# Patient Record
Sex: FEMALE | Race: OTHER | Employment: OTHER | URBAN - METROPOLITAN AREA
[De-identification: names, ages, dates, MRNs, and addresses within clinical notes are randomized per-mention and may not be internally consistent; named-entity substitution may affect disease eponyms.]

---

## 2019-07-08 ENCOUNTER — OFFICE VISIT (OUTPATIENT)
Dept: FAMILY MEDICINE CLINIC | Facility: CLINIC | Age: 84
End: 2019-07-08
Payer: MEDICAID

## 2019-07-08 VITALS
DIASTOLIC BLOOD PRESSURE: 60 MMHG | SYSTOLIC BLOOD PRESSURE: 118 MMHG | TEMPERATURE: 99 F | HEART RATE: 68 BPM | WEIGHT: 152.38 LBS | BODY MASS INDEX: 29.53 KG/M2 | OXYGEN SATURATION: 97 % | HEIGHT: 60.43 IN

## 2019-07-08 DIAGNOSIS — I47.1 SUPRAVENTRICULAR TACHYCARDIA (HCC): ICD-10-CM

## 2019-07-08 DIAGNOSIS — S91.301A WOUND OF RIGHT FOOT: Primary | ICD-10-CM

## 2019-07-08 DIAGNOSIS — Z13.820 ENCOUNTER FOR SCREENING FOR OSTEOPOROSIS: ICD-10-CM

## 2019-07-08 PROCEDURE — 99203 OFFICE O/P NEW LOW 30 MIN: CPT | Performed by: FAMILY MEDICINE

## 2019-07-08 NOTE — PATIENT INSTRUCTIONS
-- llama para hacer tripp bertha de londono infection  -- continua antibiotico un ves al lauro    -- regresa en 2 wks para londono physico  -- regresa en ayunas para lazara en lo mismo lauro

## 2019-07-08 NOTE — PROGRESS NOTES
Cornelio Dutton is a 82year old female here for Patient presents with: Foot Injury: Right foot injury in May. Hit her self with a wood stick and a couple of days later a juice container fell hitting her on the same spot as the wood had hit her.  Several d negative    PHYSICAL EXAM:   /60 (BP Location: Left arm, Patient Position: Sitting, Cuff Size: adult)   Pulse 68   Temp 98.5 °F (36.9 °C) (Oral)   Ht 60.43\"   Wt 152 lb 6 oz   SpO2 97%   BMI 29.33 kg/m²     Gen: NAD, alert and oriented x 3  HEENT: N

## 2019-07-26 ENCOUNTER — OFFICE VISIT (OUTPATIENT)
Dept: WOUND CARE | Facility: HOSPITAL | Age: 84
End: 2019-07-26
Attending: INTERNAL MEDICINE
Payer: MEDICAID

## 2019-07-26 DIAGNOSIS — R60.0 LOCALIZED EDEMA: ICD-10-CM

## 2019-07-26 DIAGNOSIS — S91.001A UNSPECIFIED OPEN WOUND, RIGHT ANKLE, INITIAL ENCOUNTER: Primary | ICD-10-CM

## 2019-07-26 PROCEDURE — 11042 DBRDMT SUBQ TIS 1ST 20SQCM/<: CPT

## 2019-08-02 ENCOUNTER — OFFICE VISIT (OUTPATIENT)
Dept: WOUND CARE | Facility: HOSPITAL | Age: 84
End: 2019-08-02
Attending: INTERNAL MEDICINE
Payer: MEDICAID

## 2019-08-02 DIAGNOSIS — S91.001A UNSPECIFIED OPEN WOUND, RIGHT ANKLE, INITIAL ENCOUNTER: Primary | ICD-10-CM

## 2019-08-02 PROCEDURE — 97597 DBRDMT OPN WND 1ST 20 CM/<: CPT

## 2019-08-02 NOTE — PROGRESS NOTES
Chief Complaint  This information was obtained from the patient  Patient is here for a wound care follow up. She continues to have some intermittent pain. She is asking about an antibiotic to help speed up the healing process.     Allergies  Cardizem Citizen of [place] Local Pulse is Normal.    Lower Extremity Assessment  Edema Assessment:  Left Extremity: Edema is not present  Calf Measurement 31 cm from heel  Ankle Measurement 10 cm from heel  Right Extremity: Edema is not present  Calf Measurement 31 cm from heel with cleanser  Honey Gel  Bordered gauze  Change Dressing Daily and PRN    Additional Orders:    Topicals:  4% Topical Lidocaine    Follow-Up Appointments  Return Appointment in 1 week. Care summary  Discussed the Plan of Care at bedside with patient.  The pa

## 2019-08-16 ENCOUNTER — OFFICE VISIT (OUTPATIENT)
Dept: WOUND CARE | Facility: HOSPITAL | Age: 84
End: 2019-08-16
Attending: INTERNAL MEDICINE
Payer: MEDICAID

## 2019-08-16 DIAGNOSIS — R60.0 LOCALIZED EDEMA: ICD-10-CM

## 2019-08-16 DIAGNOSIS — S91.001A UNSPECIFIED OPEN WOUND, RIGHT ANKLE, INITIAL ENCOUNTER: Primary | ICD-10-CM

## 2019-08-16 PROCEDURE — 97597 DBRDMT OPN WND 1ST 20 CM/<: CPT

## 2019-08-16 NOTE — PROGRESS NOTES
Chief Complaint  This information was obtained from the patient  Patient is here for a wound care follow up. She continues to have some intermittent pain.      Allergies  Cardizem (Reaction: rash)    HPI  This information was obtained from the patient Assessment  Edema Assessment:  Left Extremity: Edema is not present  Calf Measurement 31 cm from heel  Ankle Measurement 10 cm from heel  Right Extremity: Edema is present  Compression Device In Use: Yes  Device Used Correctly: Yes  Compression Device Used Orders:  Wound #1 Right, Anterior Ankle     Wound Cleansing & Dressings  Cleanse with saline or wound cleanser  Honey Gel  Bordered gauze  Change Dressing Daily and PRN    Off-Loading  Other: - ANKLE BRACE TO RESTRICT MOVEMENT AT THE ANKLE    Additional Or

## 2019-08-23 ENCOUNTER — LAB ENCOUNTER (OUTPATIENT)
Dept: LAB | Age: 84
End: 2019-08-23
Attending: FAMILY MEDICINE
Payer: MEDICAID

## 2019-08-23 ENCOUNTER — OFFICE VISIT (OUTPATIENT)
Dept: FAMILY MEDICINE CLINIC | Facility: CLINIC | Age: 84
End: 2019-08-23
Payer: MEDICAID

## 2019-08-23 VITALS
HEIGHT: 60.43 IN | BODY MASS INDEX: 29.69 KG/M2 | HEART RATE: 66 BPM | OXYGEN SATURATION: 98 % | DIASTOLIC BLOOD PRESSURE: 60 MMHG | WEIGHT: 153.25 LBS | TEMPERATURE: 98 F | SYSTOLIC BLOOD PRESSURE: 122 MMHG

## 2019-08-23 DIAGNOSIS — S91.301A WOUND OF RIGHT FOOT: ICD-10-CM

## 2019-08-23 DIAGNOSIS — Z00.00 ROUTINE GENERAL MEDICAL EXAMINATION AT A HEALTH CARE FACILITY: ICD-10-CM

## 2019-08-23 DIAGNOSIS — Z23 IMMUNIZATION DUE: ICD-10-CM

## 2019-08-23 DIAGNOSIS — Z00.00 ROUTINE GENERAL MEDICAL EXAMINATION AT A HEALTH CARE FACILITY: Primary | ICD-10-CM

## 2019-08-23 DIAGNOSIS — I47.1 SUPRAVENTRICULAR TACHYCARDIA (HCC): ICD-10-CM

## 2019-08-23 LAB
ALBUMIN SERPL-MCNC: 3.9 G/DL (ref 3.4–5)
ALBUMIN/GLOB SERPL: 1.3 {RATIO} (ref 1–2)
ALP LIVER SERPL-CCNC: 106 U/L (ref 55–142)
ALT SERPL-CCNC: 15 U/L (ref 13–56)
ANION GAP SERPL CALC-SCNC: 6 MMOL/L (ref 0–18)
AST SERPL-CCNC: 18 U/L (ref 15–37)
BASOPHILS # BLD AUTO: 0.03 X10(3) UL (ref 0–0.2)
BASOPHILS NFR BLD AUTO: 0.6 %
BILIRUB SERPL-MCNC: 0.5 MG/DL (ref 0.1–2)
BUN BLD-MCNC: 18 MG/DL (ref 7–18)
BUN/CREAT SERPL: 28.1 (ref 10–20)
CALCIUM BLD-MCNC: 8.9 MG/DL (ref 8.5–10.1)
CHLORIDE SERPL-SCNC: 107 MMOL/L (ref 98–112)
CHOLEST SMN-MCNC: 183 MG/DL (ref ?–200)
CO2 SERPL-SCNC: 27 MMOL/L (ref 21–32)
CREAT BLD-MCNC: 0.64 MG/DL (ref 0.55–1.02)
DEPRECATED RDW RBC AUTO: 43.8 FL (ref 35.1–46.3)
EOSINOPHIL # BLD AUTO: 0.21 X10(3) UL (ref 0–0.7)
EOSINOPHIL NFR BLD AUTO: 4.1 %
ERYTHROCYTE [DISTWIDTH] IN BLOOD BY AUTOMATED COUNT: 14.4 % (ref 11–15)
GLOBULIN PLAS-MCNC: 3 G/DL (ref 2.8–4.4)
GLUCOSE BLD-MCNC: 108 MG/DL (ref 70–99)
HCT VFR BLD AUTO: 43.1 % (ref 35–48)
HDLC SERPL-MCNC: 39 MG/DL (ref 40–59)
HGB BLD-MCNC: 13.7 G/DL (ref 12–16)
IMM GRANULOCYTES # BLD AUTO: 0.01 X10(3) UL (ref 0–1)
IMM GRANULOCYTES NFR BLD: 0.2 %
LDLC SERPL CALC-MCNC: 99 MG/DL (ref ?–100)
LYMPHOCYTES # BLD AUTO: 1.01 X10(3) UL (ref 1–4)
LYMPHOCYTES NFR BLD AUTO: 19.9 %
M PROTEIN MFR SERPL ELPH: 6.9 G/DL (ref 6.4–8.2)
MCH RBC QN AUTO: 26.6 PG (ref 26–34)
MCHC RBC AUTO-ENTMCNC: 31.8 G/DL (ref 31–37)
MCV RBC AUTO: 83.5 FL (ref 80–100)
MONOCYTES # BLD AUTO: 0.35 X10(3) UL (ref 0.1–1)
MONOCYTES NFR BLD AUTO: 6.9 %
NEUTROPHILS # BLD AUTO: 3.47 X10 (3) UL (ref 1.5–7.7)
NEUTROPHILS # BLD AUTO: 3.47 X10(3) UL (ref 1.5–7.7)
NEUTROPHILS NFR BLD AUTO: 68.3 %
NONHDLC SERPL-MCNC: 144 MG/DL (ref ?–130)
OSMOLALITY SERPL CALC.SUM OF ELEC: 292 MOSM/KG (ref 275–295)
PLATELET # BLD AUTO: 127 10(3)UL (ref 150–450)
POTASSIUM SERPL-SCNC: 4 MMOL/L (ref 3.5–5.1)
RBC # BLD AUTO: 5.16 X10(6)UL (ref 3.8–5.3)
SODIUM SERPL-SCNC: 140 MMOL/L (ref 136–145)
TRIGL SERPL-MCNC: 223 MG/DL (ref 30–149)
VLDLC SERPL CALC-MCNC: 45 MG/DL (ref 0–30)
WBC # BLD AUTO: 5.1 X10(3) UL (ref 4–11)

## 2019-08-23 PROCEDURE — 80061 LIPID PANEL: CPT

## 2019-08-23 PROCEDURE — 90472 IMMUNIZATION ADMIN EACH ADD: CPT | Performed by: FAMILY MEDICINE

## 2019-08-23 PROCEDURE — 99214 OFFICE O/P EST MOD 30 MIN: CPT | Performed by: FAMILY MEDICINE

## 2019-08-23 PROCEDURE — 90471 IMMUNIZATION ADMIN: CPT | Performed by: FAMILY MEDICINE

## 2019-08-23 PROCEDURE — 36415 COLL VENOUS BLD VENIPUNCTURE: CPT

## 2019-08-23 PROCEDURE — 85025 COMPLETE CBC W/AUTO DIFF WBC: CPT

## 2019-08-23 PROCEDURE — 90715 TDAP VACCINE 7 YRS/> IM: CPT | Performed by: FAMILY MEDICINE

## 2019-08-23 PROCEDURE — 99397 PER PM REEVAL EST PAT 65+ YR: CPT | Performed by: FAMILY MEDICINE

## 2019-08-23 PROCEDURE — 80053 COMPREHEN METABOLIC PANEL: CPT

## 2019-08-23 NOTE — PROGRESS NOTES
Maciel Yang is a 82year old female who is here for Patient presents with: Well Adult  Wound Recheck: Patient is following up with the wound clinic. She is gong every other week. Last appointment was last Friday.        HPI:     Right foot wound  -sl dizzyness, LOC, falls  VISION: denies any blurred or double vision  RESPIRATORY: denies shortness of breath, cough, or congestion  CARDIOVASCULAR: denies chest pain, pressure or palpitations  GI: denies abdominal pain, constipation, diarrhea, n/v, BRBPR, m score (Libra Calvo et al., 2013) failed to calculate for the following reasons:     The 2013 ASCVD risk score is only valid for ages 36 to 78    ASSESSMENT AND PLAN:     Health Maintenance  -We discussed the following:  Healthy diet and exercise, cancer sc

## 2019-08-23 NOTE — PATIENT INSTRUCTIONS
Continue to eat healthy    Walk frequently    We will call with bloodwork results    Continue with wound clinic    Neck stretching daily    Followup in 6 months, sooner if needed

## 2019-08-28 ENCOUNTER — OFFICE VISIT (OUTPATIENT)
Dept: WOUND CARE | Facility: HOSPITAL | Age: 84
End: 2019-08-28
Attending: INTERNAL MEDICINE
Payer: MEDICAID

## 2019-08-28 DIAGNOSIS — S91.001A UNSPECIFIED OPEN WOUND, RIGHT ANKLE, INITIAL ENCOUNTER: Primary | ICD-10-CM

## 2019-08-28 DIAGNOSIS — R60.0 LOCALIZED EDEMA: ICD-10-CM

## 2019-08-28 PROCEDURE — 97597 DBRDMT OPN WND 1ST 20 CM/<: CPT

## 2019-08-28 NOTE — PROGRESS NOTES
Chief Complaint  This information was obtained from the patient  Patient is here for a wound care follow up. She denies pain to the wound.     Allergies  Cardizem (Reaction: rash)    HPI  This information was obtained from the patient    8-26-19: Wound im skin does not exhibit signs or symptoms of infection.  Local Pulse is Normal.    Lower Extremity Assessment  Edema Assessment:  Left Extremity: Edema is not present  Calf Measurement 31 cm from heel  Ankle Measurement 10 cm from heel  Right Extremity: Edema patient. The patient verbally acknowledges understanding of all instructions and all questions were answered. Wound improving. No s/s of infection.

## 2019-09-18 ENCOUNTER — OFFICE VISIT (OUTPATIENT)
Dept: WOUND CARE | Facility: HOSPITAL | Age: 84
End: 2019-09-18
Attending: INTERNAL MEDICINE
Payer: MEDICAID

## 2019-09-18 DIAGNOSIS — R60.0 LOCALIZED EDEMA: ICD-10-CM

## 2019-09-18 DIAGNOSIS — S91.001A UNSPECIFIED OPEN WOUND, RIGHT ANKLE, INITIAL ENCOUNTER: Primary | ICD-10-CM

## 2019-09-18 PROCEDURE — 99213 OFFICE O/P EST LOW 20 MIN: CPT

## 2019-09-18 NOTE — PROGRESS NOTES
Chief Complaint  This information was obtained from the patient  Patient is here for a wound care follow up.  Patients denies pain on the wound    Allergies  Cardizem (Reaction: rash)    HPI  This information was obtained from the patient    9-18-19: [facility] Hemosiderosis. The periwound skin did not exhibit: Induration. The temperature of the periwound skin is Warm. Periwound skin does not exhibit signs or symptoms of infection.  Local Pulse is Normal.        Assessment    Active Problems    ICD-10  (Encounter

## 2019-09-30 ENCOUNTER — OFFICE VISIT (OUTPATIENT)
Dept: WOUND CARE | Facility: HOSPITAL | Age: 84
End: 2019-09-30
Attending: INTERNAL MEDICINE
Payer: MEDICAID

## 2019-09-30 DIAGNOSIS — R60.0 LOCALIZED EDEMA: ICD-10-CM

## 2019-09-30 DIAGNOSIS — S91.001A UNSPECIFIED OPEN WOUND, RIGHT ANKLE, INITIAL ENCOUNTER: Primary | ICD-10-CM

## 2019-09-30 PROCEDURE — 99213 OFFICE O/P EST LOW 20 MIN: CPT

## 2019-09-30 NOTE — PROGRESS NOTES
Chief Complaint  This information was obtained from the patient  Patient is here for a wound care follow up. Patients denies pain to the wound. Patient has been using Aloe vera on the wound, as well as honey.     Allergies  Cardizem (Reaction: rash)    HP wound margin is well defined. Wound bed has % pink, firm granulation. The periwound skin exhibited: Hemosiderosis. The periwound skin did not exhibit: Edema, Induration. The temperature of the periwound skin is Warm.  Periwound skin does not exhibit

## 2020-08-31 ENCOUNTER — OFFICE VISIT (OUTPATIENT)
Dept: FAMILY MEDICINE CLINIC | Facility: CLINIC | Age: 85
End: 2020-08-31
Payer: MEDICAID

## 2020-08-31 VITALS
TEMPERATURE: 98 F | BODY MASS INDEX: 28.68 KG/M2 | HEART RATE: 68 BPM | OXYGEN SATURATION: 98 % | WEIGHT: 148 LBS | SYSTOLIC BLOOD PRESSURE: 128 MMHG | HEIGHT: 60.43 IN | DIASTOLIC BLOOD PRESSURE: 58 MMHG

## 2020-08-31 DIAGNOSIS — I47.1 SUPRAVENTRICULAR TACHYCARDIA (HCC): ICD-10-CM

## 2020-08-31 DIAGNOSIS — Z00.00 ROUTINE GENERAL MEDICAL EXAMINATION AT A HEALTH CARE FACILITY: Primary | ICD-10-CM

## 2020-08-31 DIAGNOSIS — Z78.0 POST-MENOPAUSAL: ICD-10-CM

## 2020-08-31 DIAGNOSIS — R42 DIZZINESS: ICD-10-CM

## 2020-08-31 PROCEDURE — 99397 PER PM REEVAL EST PAT 65+ YR: CPT | Performed by: FAMILY MEDICINE

## 2020-08-31 PROCEDURE — 99214 OFFICE O/P EST MOD 30 MIN: CPT | Performed by: FAMILY MEDICINE

## 2020-08-31 PROCEDURE — 3074F SYST BP LT 130 MM HG: CPT | Performed by: FAMILY MEDICINE

## 2020-08-31 PROCEDURE — 3008F BODY MASS INDEX DOCD: CPT | Performed by: FAMILY MEDICINE

## 2020-08-31 PROCEDURE — 3078F DIAST BP <80 MM HG: CPT | Performed by: FAMILY MEDICINE

## 2020-08-31 NOTE — PROGRESS NOTES
Quincy Antunez is a 82year old female who is here for Patient presents with:  Wellness Visit      HPI:       SVT  -tolerating meds  -schedule for echo for murmur  -sees cardiology (Dr. Yvette Ruiz)  -no palpitations    Dizziness  -comes and goes  -better n headaches, LH, dizzyness, LOC, falls  VISION: denies any blurred or double vision  RESPIRATORY: denies shortness of breath, cough, or congestion  CARDIOVASCULAR: denies chest pain, pressure or palpitations  GI: denies abdominal pain, constipation, diarrhea and very little biofilm    The ASCVD Risk score (Corrie Sanchez, et al., 2013) failed to calculate for the following reasons:     The 2013 ASCVD risk score is only valid for ages 36 to 78    ASSESSMENT AND PLAN:     Health Maintenance  -We discussed the follow

## 2020-08-31 NOTE — PATIENT INSTRUCTIONS
Llama para hacer tripp bertha de pruebas de [place] y radiografia para londono huesos    -- schedule appt for fasting bloodwork anytime that you are able to (fast for 8-10 hours minimum, no food. Water is fine). -- go to [employer]. org or call [phone] to sche

## 2020-09-01 ENCOUNTER — LAB ENCOUNTER (OUTPATIENT)
Dept: LAB | Age: 85
End: 2020-09-01
Attending: FAMILY MEDICINE
Payer: MEDICAID

## 2020-09-01 DIAGNOSIS — Z00.00 ROUTINE GENERAL MEDICAL EXAMINATION AT A HEALTH CARE FACILITY: ICD-10-CM

## 2020-09-01 LAB
ALBUMIN SERPL-MCNC: 3.6 G/DL (ref 3.4–5)
ALBUMIN/GLOB SERPL: 1.2 {RATIO} (ref 1–2)
ALP LIVER SERPL-CCNC: 100 U/L (ref 55–142)
ALT SERPL-CCNC: 14 U/L (ref 13–56)
ANION GAP SERPL CALC-SCNC: 5 MMOL/L (ref 0–18)
AST SERPL-CCNC: 17 U/L (ref 15–37)
BASOPHILS # BLD AUTO: 0.02 X10(3) UL (ref 0–0.2)
BASOPHILS NFR BLD AUTO: 0.4 %
BILIRUB SERPL-MCNC: 0.5 MG/DL (ref 0.1–2)
BUN BLD-MCNC: 17 MG/DL (ref 7–18)
BUN/CREAT SERPL: 26.2 (ref 10–20)
CALCIUM BLD-MCNC: 9.3 MG/DL (ref 8.5–10.1)
CHLORIDE SERPL-SCNC: 109 MMOL/L (ref 98–112)
CHOLEST SMN-MCNC: 187 MG/DL (ref ?–200)
CO2 SERPL-SCNC: 27 MMOL/L (ref 21–32)
CREAT BLD-MCNC: 0.65 MG/DL (ref 0.55–1.02)
DEPRECATED RDW RBC AUTO: 43.8 FL (ref 35.1–46.3)
EOSINOPHIL # BLD AUTO: 0.21 X10(3) UL (ref 0–0.7)
EOSINOPHIL NFR BLD AUTO: 4.3 %
ERYTHROCYTE [DISTWIDTH] IN BLOOD BY AUTOMATED COUNT: 14.3 % (ref 11–15)
GLOBULIN PLAS-MCNC: 3 G/DL (ref 2.8–4.4)
GLUCOSE BLD-MCNC: 118 MG/DL (ref 70–99)
HCT VFR BLD AUTO: 42.8 % (ref 35–48)
HDLC SERPL-MCNC: 45 MG/DL (ref 40–59)
HGB BLD-MCNC: 13.3 G/DL (ref 12–16)
IMM GRANULOCYTES # BLD AUTO: 0.02 X10(3) UL (ref 0–1)
IMM GRANULOCYTES NFR BLD: 0.4 %
LDLC SERPL CALC-MCNC: 107 MG/DL (ref ?–100)
LYMPHOCYTES # BLD AUTO: 1.13 X10(3) UL (ref 1–4)
LYMPHOCYTES NFR BLD AUTO: 23.3 %
M PROTEIN MFR SERPL ELPH: 6.6 G/DL (ref 6.4–8.2)
MCH RBC QN AUTO: 26 PG (ref 26–34)
MCHC RBC AUTO-ENTMCNC: 31.1 G/DL (ref 31–37)
MCV RBC AUTO: 83.8 FL (ref 80–100)
MONOCYTES # BLD AUTO: 0.38 X10(3) UL (ref 0.1–1)
MONOCYTES NFR BLD AUTO: 7.9 %
NEUTROPHILS # BLD AUTO: 3.08 X10 (3) UL (ref 1.5–7.7)
NEUTROPHILS # BLD AUTO: 3.08 X10(3) UL (ref 1.5–7.7)
NEUTROPHILS NFR BLD AUTO: 63.7 %
NONHDLC SERPL-MCNC: 142 MG/DL (ref ?–130)
OSMOLALITY SERPL CALC.SUM OF ELEC: 295 MOSM/KG (ref 275–295)
PATIENT FASTING Y/N/NP: YES
PATIENT FASTING Y/N/NP: YES
PLATELET # BLD AUTO: 121 10(3)UL (ref 150–450)
POTASSIUM SERPL-SCNC: 3.8 MMOL/L (ref 3.5–5.1)
RBC # BLD AUTO: 5.11 X10(6)UL (ref 3.8–5.3)
SODIUM SERPL-SCNC: 141 MMOL/L (ref 136–145)
TRIGL SERPL-MCNC: 173 MG/DL (ref 30–149)
TSI SER-ACNC: 1.64 MIU/ML (ref 0.36–3.74)
VLDLC SERPL CALC-MCNC: 35 MG/DL (ref 0–30)
WBC # BLD AUTO: 4.8 X10(3) UL (ref 4–11)

## 2020-09-01 PROCEDURE — 36415 COLL VENOUS BLD VENIPUNCTURE: CPT

## 2020-09-01 PROCEDURE — 84443 ASSAY THYROID STIM HORMONE: CPT

## 2020-09-01 PROCEDURE — 80053 COMPREHEN METABOLIC PANEL: CPT

## 2020-09-01 PROCEDURE — 85025 COMPLETE CBC W/AUTO DIFF WBC: CPT

## 2020-09-01 PROCEDURE — 80061 LIPID PANEL: CPT

## 2020-09-04 ENCOUNTER — HOSPITAL ENCOUNTER (OUTPATIENT)
Dept: BONE DENSITY | Age: 85
Discharge: HOME | End: 2020-09-04
Attending: FAMILY MEDICINE
Payer: MEDICAID

## 2020-09-04 DIAGNOSIS — Z78.0 POST-MENOPAUSAL: ICD-10-CM

## 2020-09-04 PROCEDURE — 77080 DXA BONE DENSITY AXIAL: CPT | Performed by: FAMILY MEDICINE

## 2020-09-08 ENCOUNTER — TELEPHONE (OUTPATIENT)
Dept: FAMILY MEDICINE CLINIC | Facility: CLINIC | Age: 85
End: 2020-09-08

## 2020-09-08 NOTE — TELEPHONE ENCOUNTER
----- Message from Lakisha Aponte MD sent at 9/8/2020  1:14 PM CDT -----  Labs stable. Followup as planned.

## 2020-11-03 ENCOUNTER — OFFICE VISIT (OUTPATIENT)
Dept: FAMILY MEDICINE CLINIC | Facility: CLINIC | Age: 85
End: 2020-11-03
Payer: MEDICAID

## 2020-11-03 VITALS
DIASTOLIC BLOOD PRESSURE: 60 MMHG | HEIGHT: 60.43 IN | SYSTOLIC BLOOD PRESSURE: 120 MMHG | BODY MASS INDEX: 28.29 KG/M2 | TEMPERATURE: 98 F | WEIGHT: 146 LBS | HEART RATE: 67 BPM

## 2020-11-03 DIAGNOSIS — E78.2 MIXED HYPERLIPIDEMIA: ICD-10-CM

## 2020-11-03 DIAGNOSIS — R73.9 HYPERGLYCEMIA: ICD-10-CM

## 2020-11-03 DIAGNOSIS — M81.0 AGE-RELATED OSTEOPOROSIS WITHOUT CURRENT PATHOLOGICAL FRACTURE: Primary | ICD-10-CM

## 2020-11-03 PROCEDURE — 99214 OFFICE O/P EST MOD 30 MIN: CPT | Performed by: FAMILY MEDICINE

## 2020-11-03 PROCEDURE — 3074F SYST BP LT 130 MM HG: CPT | Performed by: FAMILY MEDICINE

## 2020-11-03 PROCEDURE — 3078F DIAST BP <80 MM HG: CPT | Performed by: FAMILY MEDICINE

## 2020-11-03 PROCEDURE — 3008F BODY MASS INDEX DOCD: CPT | Performed by: FAMILY MEDICINE

## 2020-11-03 RX ORDER — ALENDRONATE SODIUM 70 MG/1
70 TABLET ORAL WEEKLY
Qty: 12 TABLET | Refills: 1 | Status: SHIPPED | OUTPATIENT
Start: 2020-11-03 | End: 2021-06-14

## 2020-11-03 NOTE — PATIENT INSTRUCTIONS
Menos carbohidratos y [place] alendronate tripp pastilla cada semana con agua (y espera 27 minutos sin comer y liquidos despuse tragando la pastilla)    Regresa en 6 meses, antes si necessita

## 2020-11-09 NOTE — PROGRESS NOTES
Osmar Kocher Preet Grimaldo is a 82year old female here for Patient presents with:  Lab Results: Labs done on 09/01/2020  Test Results: Bone density scan done on 09/04/2020  Derm Problem: Right leg redness and very itchy      HPI:       1. Age-related oste complaints  --HEME/LYMPH/IMMUN: No other complaints  --ENDO: No other complaints  --SKIN: No other complaints  All other systems reviewed are negative    PHYSICAL EXAM:   /60 (BP Location: Left arm, Patient Position: Sitting, Cuff Size: adult)   Puls

## 2021-05-07 ENCOUNTER — TELEPHONE (OUTPATIENT)
Dept: FAMILY MEDICINE CLINIC | Facility: CLINIC | Age: 86
End: 2021-05-07

## 2021-05-07 NOTE — TELEPHONE ENCOUNTER
Daughter brought form in for Dr. Bulmaro Ulloa to complete. Patient is having dental work and dentist would like Dr. Bulmaro Ulloa to address a few things. Form with Lizbeth.

## 2021-05-07 NOTE — TELEPHONE ENCOUNTER
Tien Randle is calling because she is not sure if she needed to come in to see Dr Darylene Hews or if she needs a lab test done, he put her on a medication for her bones, and she is not sure what her next step is, she know he said to follow up in April or May, Please

## 2021-05-07 NOTE — TELEPHONE ENCOUNTER
Per office visit in November;     1. Age-related osteoporosis without current pathological fracture  -new dx OA  -start fosamax  -risks and side effects of med discussed, patient expressed understanding  -f/u in 3-6 months, sooner prn       No labs are ord

## 2021-05-12 NOTE — TELEPHONE ENCOUNTER
Pt's daughter called asking if the paperwork has been signed and when can she pick it up? There isn't anything in the  the front office.

## 2021-05-12 NOTE — TELEPHONE ENCOUNTER
Called [facility] to verify date of treatment, spoke with Sohan. Sohan informed me that procedure will be scheduled after they receive medical clearance. Patient is scheduled to see Dr. Tereso Castano on 06/14/2021.  Sohan stated that they do not have any

## 2021-06-14 ENCOUNTER — OFFICE VISIT (OUTPATIENT)
Dept: FAMILY MEDICINE CLINIC | Facility: CLINIC | Age: 86
End: 2021-06-14
Payer: MEDICAID

## 2021-06-14 VITALS
BODY MASS INDEX: 29.07 KG/M2 | HEIGHT: 60.43 IN | OXYGEN SATURATION: 98 % | HEART RATE: 76 BPM | WEIGHT: 150 LBS | SYSTOLIC BLOOD PRESSURE: 128 MMHG | DIASTOLIC BLOOD PRESSURE: 80 MMHG | TEMPERATURE: 98 F

## 2021-06-14 DIAGNOSIS — M81.0 AGE-RELATED OSTEOPOROSIS WITHOUT CURRENT PATHOLOGICAL FRACTURE: Primary | ICD-10-CM

## 2021-06-14 DIAGNOSIS — E78.2 MIXED HYPERLIPIDEMIA: ICD-10-CM

## 2021-06-14 DIAGNOSIS — I47.1 SUPRAVENTRICULAR TACHYCARDIA (HCC): ICD-10-CM

## 2021-06-14 DIAGNOSIS — R73.9 HYPERGLYCEMIA: ICD-10-CM

## 2021-06-14 PROCEDURE — 99214 OFFICE O/P EST MOD 30 MIN: CPT | Performed by: FAMILY MEDICINE

## 2021-06-14 PROCEDURE — 3008F BODY MASS INDEX DOCD: CPT | Performed by: FAMILY MEDICINE

## 2021-06-14 PROCEDURE — 3074F SYST BP LT 130 MM HG: CPT | Performed by: FAMILY MEDICINE

## 2021-06-14 PROCEDURE — 3079F DIAST BP 80-89 MM HG: CPT | Performed by: FAMILY MEDICINE

## 2021-06-14 RX ORDER — ALENDRONATE SODIUM 70 MG/1
70 TABLET ORAL WEEKLY
Qty: 12 TABLET | Refills: 1 | Status: SHIPPED | OUTPATIENT
Start: 2021-06-14 | End: 2021-11-01

## 2021-06-14 NOTE — TELEPHONE ENCOUNTER
Faxed Signed clearance for Dental treatment to [facility] to fax # [phone]    [facility]  # [phone]

## 2021-06-15 NOTE — PROGRESS NOTES
Rush Escobedo Preet Grimaldo is a 82year old female here for Patient presents with:  Medication Follow-Up: Alendronate 70 mg, patient stopped taking 1 month ago.       HPI:       1. Age-related osteoporosis without current pathological fracture  -tolerating other complaints  --: No other complaints  --MSK: No other complaints  --NEURO: No other complaints  --PSYCH: No other complaints  --HEME/LYMPH/IMMUN: No other complaints  --ENDO: No other complaints  --SKIN: No other complaints  All other systems review

## 2021-09-20 ENCOUNTER — OFFICE VISIT (OUTPATIENT)
Dept: FAMILY MEDICINE CLINIC | Facility: CLINIC | Age: 86
End: 2021-09-20
Payer: MEDICAID

## 2021-09-20 VITALS
DIASTOLIC BLOOD PRESSURE: 70 MMHG | WEIGHT: 154 LBS | TEMPERATURE: 98 F | OXYGEN SATURATION: 96 % | SYSTOLIC BLOOD PRESSURE: 132 MMHG | HEART RATE: 80 BPM | BODY MASS INDEX: 30.23 KG/M2 | HEIGHT: 60 IN

## 2021-09-20 DIAGNOSIS — R42 DIZZINESS AND GIDDINESS: ICD-10-CM

## 2021-09-20 DIAGNOSIS — Z00.00 ROUTINE GENERAL MEDICAL EXAMINATION AT A HEALTH CARE FACILITY: Primary | ICD-10-CM

## 2021-09-20 DIAGNOSIS — M81.0 AGE-RELATED OSTEOPOROSIS WITHOUT CURRENT PATHOLOGICAL FRACTURE: ICD-10-CM

## 2021-09-20 DIAGNOSIS — I47.1 SUPRAVENTRICULAR TACHYCARDIA (HCC): ICD-10-CM

## 2021-09-20 DIAGNOSIS — Z23 NEED FOR VACCINATION: ICD-10-CM

## 2021-09-20 PROCEDURE — 3075F SYST BP GE 130 - 139MM HG: CPT | Performed by: FAMILY MEDICINE

## 2021-09-20 PROCEDURE — 90471 IMMUNIZATION ADMIN: CPT | Performed by: FAMILY MEDICINE

## 2021-09-20 PROCEDURE — 90662 IIV NO PRSV INCREASED AG IM: CPT | Performed by: FAMILY MEDICINE

## 2021-09-20 PROCEDURE — 99214 OFFICE O/P EST MOD 30 MIN: CPT | Performed by: FAMILY MEDICINE

## 2021-09-20 PROCEDURE — 3078F DIAST BP <80 MM HG: CPT | Performed by: FAMILY MEDICINE

## 2021-09-20 PROCEDURE — 99397 PER PM REEVAL EST PAT 65+ YR: CPT | Performed by: FAMILY MEDICINE

## 2021-09-20 PROCEDURE — 3008F BODY MASS INDEX DOCD: CPT | Performed by: FAMILY MEDICINE

## 2021-09-20 RX ORDER — FLUTICASONE PROPIONATE 50 MCG
2 SPRAY, SUSPENSION (ML) NASAL DAILY
Qty: 16 G | Refills: 1 | Status: SHIPPED | OUTPATIENT
Start: 2021-09-20 | End: 2021-12-19

## 2021-09-20 NOTE — PROGRESS NOTES
Maciel Slider Preet Breannasvannavee is a 82year old female who is here for Patient presents with:  Wellness Visit: Medicare Annual Wellness visit      HPI:       SVT  -tolerating meds  -sees cardiology (Dr. Winston Gómez)  -no palpitations    Osteoporosis  -due for de well otherwise.  No f/c  NEURO: denies any headaches, LH, dizzyness, LOC, falls  VISION: denies any blurred or double vision  RESPIRATORY: denies shortness of breath, cough, or congestion  CARDIOVASCULAR: denies chest pain, pressure or palpitations  GI: den (Pattie Galvan, et al., 2013) failed to calculate for the following reasons:     The 2013 ASCVD risk score is only valid for ages 36 to 78    ASSESSMENT AND PLAN:     Health Maintenance  -We discussed the following:  Healthy diet and exercise, cancer screenin

## 2021-09-20 NOTE — PATIENT INSTRUCTIONS
Va a [facility] para lazara en ayunas    Calcetines para londono venas - cuando necessita    Empeza claritin o zyrtec (sin receta) un al lauro    Empeza spray de nariz cada noche    Regresa en verano    Llama para bertha de bone density antes de londono bertha

## 2021-09-29 LAB
ABSOLUTE BASOPHILS: 41 CELLS/UL (ref 0–200)
ABSOLUTE EOSINOPHILS: 275 CELLS/UL (ref 15–500)
ABSOLUTE LYMPHOCYTES: 999 CELLS/UL (ref 850–3900)
ABSOLUTE MONOCYTES: 342 CELLS/UL (ref 200–950)
ABSOLUTE NEUTROPHILS: 2844 CELLS/UL (ref 1500–7800)
ALBUMIN/GLOBULIN RATIO: 1.7 (CALC) (ref 1–2.5)
ALBUMIN: 3.9 G/DL (ref 3.6–5.1)
ALKALINE PHOSPHATASE: 85 U/L (ref 37–153)
ALT: 10 U/L (ref 6–29)
AST: 18 U/L (ref 10–35)
BASOPHILS: 0.9 %
BILIRUBIN, TOTAL: 0.5 MG/DL (ref 0.2–1.2)
BUN: 20 MG/DL (ref 7–25)
CALCIUM: 8.9 MG/DL (ref 8.6–10.4)
CARBON DIOXIDE: 26 MMOL/L (ref 20–32)
CHLORIDE: 106 MMOL/L (ref 98–110)
CHOL/HDLC RATIO: 4.7 (CALC)
CHOLESTEROL, TOTAL: 186 MG/DL
CREATININE: 0.62 MG/DL (ref 0.6–0.88)
EGFR IF AFRICN AM: 96 ML/MIN/1.73M2
EGFR IF NONAFRICN AM: 83 ML/MIN/1.73M2
EOSINOPHILS: 6.1 %
GLOBULIN: 2.3 G/DL (CALC) (ref 1.9–3.7)
GLUCOSE: 134 MG/DL (ref 65–99)
HDL CHOLESTEROL: 40 MG/DL
HEMATOCRIT: 41.6 % (ref 35–45)
HEMOGLOBIN: 12.9 G/DL (ref 11.7–15.5)
LDL-CHOLESTEROL: 109 MG/DL (CALC)
LYMPHOCYTES: 22.2 %
MCH: 25.2 PG (ref 27–33)
MCHC: 31 G/DL (ref 32–36)
MCV: 81.3 FL (ref 80–100)
MONOCYTES: 7.6 %
MPV: 12.9 FL (ref 7.5–12.5)
NEUTROPHILS: 63.2 %
NON-HDL CHOLESTEROL: 146 MG/DL (CALC)
PLATELET COUNT: 138 THOUSAND/UL (ref 140–400)
POTASSIUM: 4.1 MMOL/L (ref 3.5–5.3)
PROTEIN, TOTAL: 6.2 G/DL (ref 6.1–8.1)
RDW: 13.3 % (ref 11–15)
RED BLOOD CELL COUNT: 5.12 MILLION/UL (ref 3.8–5.1)
SODIUM: 141 MMOL/L (ref 135–146)
TRIGLYCERIDES: 245 MG/DL
TSH W/REFLEX TO FT4: 2.39 MIU/L (ref 0.4–4.5)
WHITE BLOOD CELL COUNT: 4.5 THOUSAND/UL (ref 3.8–10.8)

## 2021-11-01 DIAGNOSIS — M81.0 AGE-RELATED OSTEOPOROSIS WITHOUT CURRENT PATHOLOGICAL FRACTURE: Primary | ICD-10-CM

## 2021-11-01 DIAGNOSIS — Z78.0 POST-MENOPAUSAL: ICD-10-CM

## 2021-11-01 RX ORDER — ALENDRONATE SODIUM 70 MG/1
TABLET ORAL
Qty: 12 TABLET | Refills: 3 | Status: SHIPPED | OUTPATIENT
Start: 2021-11-01

## 2021-11-01 NOTE — TELEPHONE ENCOUNTER
Refill Passed Protocol:     Pt requesting refill of alendronate     Refill was approved and sent to pharmacy:     Last Office Visit with Provider: 9/20/21    No future appointments.

## 2024-04-15 ENCOUNTER — OFFICE VISIT (OUTPATIENT)
Dept: FAMILY MEDICINE CLINIC | Facility: CLINIC | Age: 89
End: 2024-04-15
Payer: MEDICAID

## 2024-04-15 ENCOUNTER — MED REC SCAN ONLY (OUTPATIENT)
Dept: FAMILY MEDICINE CLINIC | Facility: CLINIC | Age: 89
End: 2024-04-15

## 2024-04-15 VITALS
OXYGEN SATURATION: 94 % | SYSTOLIC BLOOD PRESSURE: 128 MMHG | HEIGHT: 60.5 IN | HEART RATE: 85 BPM | BODY MASS INDEX: 25.21 KG/M2 | DIASTOLIC BLOOD PRESSURE: 76 MMHG | RESPIRATION RATE: 16 BRPM | WEIGHT: 131.81 LBS | TEMPERATURE: 98 F

## 2024-04-15 DIAGNOSIS — I47.10 SUPRAVENTRICULAR TACHYCARDIA (HCC): ICD-10-CM

## 2024-04-15 DIAGNOSIS — K56.609 SBO (SMALL BOWEL OBSTRUCTION) (HCC): ICD-10-CM

## 2024-04-15 DIAGNOSIS — Z00.00 ROUTINE GENERAL MEDICAL EXAMINATION AT A HEALTH CARE FACILITY: Primary | ICD-10-CM

## 2024-04-15 DIAGNOSIS — R26.9 GAIT DISTURBANCE: ICD-10-CM

## 2024-04-15 DIAGNOSIS — R53.1 GENERALIZED WEAKNESS: ICD-10-CM

## 2024-04-15 DIAGNOSIS — M81.0 AGE-RELATED OSTEOPOROSIS WITHOUT CURRENT PATHOLOGICAL FRACTURE: ICD-10-CM

## 2024-04-15 DIAGNOSIS — Z90.49 S/P SMALL BOWEL RESECTION: ICD-10-CM

## 2024-04-15 PROCEDURE — 99215 OFFICE O/P EST HI 40 MIN: CPT | Performed by: FAMILY MEDICINE

## 2024-04-15 PROCEDURE — 99397 PER PM REEVAL EST PAT 65+ YR: CPT | Performed by: FAMILY MEDICINE

## 2024-04-15 PROCEDURE — G8510 SCR DEP NEG, NO PLAN REQD: HCPCS | Performed by: FAMILY MEDICINE

## 2024-04-15 RX ORDER — DIFLUPREDNATE OPHTHALMIC 0.5 MG/ML
1 EMULSION OPHTHALMIC 4 TIMES DAILY
COMMUNITY
Start: 2024-04-11

## 2024-04-15 RX ORDER — ATROPINE SULFATE 10 MG/ML
1 SOLUTION/ DROPS OPHTHALMIC 2 TIMES DAILY
COMMUNITY
Start: 2024-04-11

## 2024-04-15 NOTE — PATIENT INSTRUCTIONS
Vaseline para ayudar afuera de la boca y en londono umbilico    Mas agua    Mas caminando con la caminadora    Llama para empezar therapia    Va a [facility] para lazara en ayunas en 4 semanas    Regresa en 6 semanas

## 2024-04-18 ENCOUNTER — MED REC SCAN ONLY (OUTPATIENT)
Dept: FAMILY MEDICINE CLINIC | Facility: CLINIC | Age: 89
End: 2024-04-18

## 2024-04-18 PROBLEM — M17.10 OSTEOARTHRITIS OF KNEE, UNILATERAL: Status: ACTIVE | Noted: 2024-04-18

## 2024-04-18 PROBLEM — M81.0 AGE-RELATED OSTEOPOROSIS WITHOUT CURRENT PATHOLOGICAL FRACTURE: Status: ACTIVE | Noted: 2024-04-18

## 2024-04-18 NOTE — PROGRESS NOTES
Mana Grimaldo is a 88 year old female who is here for   Chief Complaint   Patient presents with    Wellness Visit    Neck Pain    Dryness     Mouth x1 month        HPI:       1. Routine general medical examination at a health care facility  -due for wellness    2. Supraventricular tachycardia (HCC)  -tolerating meds  -sees cardiology (Dr. Traore)  -no palpitations    3. Age-related osteoporosis without current pathological fracture  -due for dexa  -off alendronate    4. SBO (small bowel obstruction) (HCC)  5. S/P small bowel resection  6. Generalized weakness  7. Gait disturbance  -was in [place] last month and found to have SBO requiring resection  -discharged after 1 wk in hospital  -has been home in [place] for about 1-2 wks  -notes continued weakness  -denies abd pain  -tolerating PO  -normal BMs      Screening:  Diet: eats well  Exercise: walks  Sleep: normal  Depression/Anxiety: none        Pertinent Fam Hx:    History reviewed. No pertinent family history.    Social History     Socioeconomic History    Marital status:    Tobacco Use    Smoking status: Never    Smokeless tobacco: Never   Vaping Use    Vaping status: Never Used   Substance and Sexual Activity    Alcohol use: Not Currently    Drug use: Never   Other Topics Concern    Caffeine Concern Yes     Comment: 1 cup of coffee daily    Stress Concern No    Weight Concern No    Special Diet No    Exercise No     Comment: daily walking    Seat Belt Yes       Wt Readings from Last 6 Encounters:   04/15/24 131 lb 12.8 oz (59.8 kg)   09/20/21 154 lb (69.9 kg)   08/16/21 154 lb (69.9 kg)   06/14/21 150 lb (68 kg)   11/03/20 146 lb (66.2 kg)   08/31/20 148 lb (67.1 kg)       Patient Active Problem List   Diagnosis    Dizziness and giddiness    Supraventricular tachycardia (HCC)    Tachycardia, unspecified    Osteoarthritis of knee, unilateral       Current Outpatient Medications on File Prior to Visit   Medication Sig Dispense Refill    apixaban  2.5 MG Oral Tab Take 1 tablet (2.5 mg total) by mouth 2 (two) times daily.      atropine 1 % Ophthalmic Solution Place 1 drop into the left eye 2 (two) times daily.      difluprednate 0.05 % Ophthalmic Emulsion Place 1 drop into the left eye 4 (four) times daily.      verapamil 120 MG Oral Tab CR Take 1 tablet (120 mg total) by mouth 2 (two) times daily. 180 tablet 3    B Complex-C-Folic Acid (HM VITAMIN B COMPLEX/VITAMIN C) Oral Tab Take 1 tablet by mouth daily.      Naproxen Sodium 220 MG Oral Cap Take 220 mg by mouth as needed.      NON FORMULARY Diosmina/ esperidina 450/50 mg 1 tab qd. Medication is from [facility] taking for poor circulation  (Patient not taking: Reported on 9/20/2021)       No current facility-administered medications on file prior to visit.         REVIEW OF SYSTEMS:     GENERAL HEALTH: feels well otherwise. No f/c  NEURO: denies any headaches, LH, dizzyness, LOC, falls  VISION: denies any blurred or double vision  RESPIRATORY: denies shortness of breath, cough, or congestion  CARDIOVASCULAR: denies chest pain, pressure or palpitations  GI: denies abdominal pain, constipation, diarrhea, n/v, BRBPR, melena  : no dysuria, frequency, or hematuria  SKIN: denies any unusual skin lesions or rashes  PSYCH: mood is stable  EXT: denies edema     Wt Readings from Last 6 Encounters:   04/15/24 131 lb 12.8 oz (59.8 kg)   09/20/21 154 lb (69.9 kg)   08/16/21 154 lb (69.9 kg)   06/14/21 150 lb (68 kg)   11/03/20 146 lb (66.2 kg)   08/31/20 148 lb (67.1 kg)       Allergies   Allergen Reactions    Diltiazem UNKNOWN       History reviewed. No pertinent family history.   Past Medical History:    FHx: supraventricular tachycardia    Hypertension      Past Surgical History:   Procedure Laterality Date    Cataract      Knee replacement surgery Bilateral     Laparoscopic cholecystectomy      Other      varicositis re moved from right leg       Social History:    Social History     Socioeconomic History    Marital  status:    Tobacco Use    Smoking status: Never    Smokeless tobacco: Never   Vaping Use    Vaping status: Never Used   Substance and Sexual Activity    Alcohol use: Not Currently    Drug use: Never   Other Topics Concern    Caffeine Concern Yes     Comment: 1 cup of coffee daily    Stress Concern No    Weight Concern No    Special Diet No    Exercise No     Comment: daily walking    Seat Belt Yes           EXAM:   /76 (BP Location: Left arm, Patient Position: Sitting, Cuff Size: adult)   Pulse 85   Temp 97.5 °F (36.4 °C) (Temporal)   Resp 16   Ht 5' 0.5\" (1.537 m)   Wt 131 lb 12.8 oz (59.8 kg)   SpO2 94%   BMI 25.32 kg/m²     GENERAL: A&O, in no apparent distress  HEENT: atraumatic, MMM, throat is clear  EYES: PERRLA, EOMI  NECK: supple, no thyromegaly  CHEST: no tenderness  LUNGS: clear to auscultation bilateraly, no c/w/r  CARDIO: RRR without murmurs  GI: soft, non-tender, non-distended, no appreciable hsm, bs throughout  NEURO: CN II-XII grossly intact  PSYCH: pleasant  MUSCULOSKELETAL: normal gait, no appreciable defects  EXTREMITIES: no cyanosis, clubbing or edema  SKIN: no rashes,no suspicious lesions    Problem focused exam (for problems outside of physical, if any):  Incisions healing well    The ASCVD Risk score (John DK, et al., 2019) failed to calculate for the following reasons:    The 2019 ASCVD risk score is only valid for ages 40 to 79    ASSESSMENT AND PLAN:     Health Maintenance  -We discussed the following:  Healthy diet and exercise, cancer screening, self breast exams and calcium and vitamin D supplementation.    -Fasting labs ordered  -recheck DEXA    Stress Management: counseled  Reviewed age appropriate screening    1. Routine general medical examination at a health care facility  - CBC WITH DIFFERENTIAL WITH PLATELET  - COMP METABOLIC PANEL (14)    2. Supraventricular tachycardia (HCC)  -stable, though was started on eliquis in [place] for her \"heart\"  -no notes in  records regarding afib  -advised to followup with cardiology to further discuss this  -may need holter    3. Age-related osteoporosis without current pathological fracture  -off alendronate  -no new issues    4. SBO (small bowel obstruction) (HCC)  5. S/P small bowel resection  6. Generalized weakness  7. Gait disturbance  -reviewed expectant management  -slow improvement  -recheck labs  -encouraged activity as tolerated  -start PT  -f/u in 6 wks, sooner prn    - OP REFERRAL TO [facility]        Orders This Visit:  Orders Placed This Encounter   Procedures    CBC With Differential With Platelet    Comp Metabolic Panel (14)       Meds This Visit:  Requested Prescriptions      No prescriptions requested or ordered in this encounter       Imaging & Referrals:  OP REFERRAL TO [facility]     The patient indicates understanding of these issues and agrees to the plan.  The patient is asked to return in 6 months, sooner prn.  AYLIN CELESTE MD    I spent a total of 40 minutes, more than half of which was spent counseling/coordinating care regarding SVT, and sbo (outside of time for wellness)

## 2024-05-13 ENCOUNTER — TELEPHONE (OUTPATIENT)
Dept: FAMILY MEDICINE CLINIC | Facility: CLINIC | Age: 89
End: 2024-05-13

## 2024-05-13 DIAGNOSIS — Z01.818 PRE-OP EXAMINATION: Primary | ICD-10-CM

## 2024-05-13 NOTE — TELEPHONE ENCOUNTER
Patients daughter calling and asking if Dr. Elder Jaimes can put in lab orders for preop boubacar (orders were sent last week)  so she can have them done prior appointment.   Future Appointments   Date Time Provider Department Center   5/28/2024 10:00 AM Elder Jaimes MD [facility]     Patient is having eye surgery on June 6  Please advise.

## 2024-05-13 NOTE — TELEPHONE ENCOUNTER
Please followup on if we received preop orders - I am ok with all preop orders requested being placed    Thanks

## 2024-05-14 ENCOUNTER — MED REC SCAN ONLY (OUTPATIENT)
Dept: FAMILY MEDICINE CLINIC | Facility: CLINIC | Age: 89
End: 2024-05-14

## 2024-05-14 NOTE — TELEPHONE ENCOUNTER
Received fax. Entered orders. Phoned daughter and let her know where to go for testing. Daughter verbalized agreement and understanding.

## 2024-05-14 NOTE — TELEPHONE ENCOUNTER
Patient has appointment but we have received no pre-op paperwork. Jacqui ALARCON phoned to request paperwork. They will fax and then I will place any needed orders.

## 2024-05-21 ENCOUNTER — EKG ENCOUNTER (OUTPATIENT)
Dept: LAB | Age: 89
End: 2024-05-21
Attending: FAMILY MEDICINE

## 2024-05-21 ENCOUNTER — LAB ENCOUNTER (OUTPATIENT)
Dept: LAB | Age: 89
End: 2024-05-21
Attending: FAMILY MEDICINE

## 2024-05-21 DIAGNOSIS — Z01.818 PRE-OP EXAMINATION: ICD-10-CM

## 2024-05-21 LAB
ALBUMIN SERPL-MCNC: 2.7 G/DL (ref 3.4–5)
ALBUMIN/GLOB SERPL: 0.7 {RATIO} (ref 1–2)
ALP LIVER SERPL-CCNC: 87 U/L
ALT SERPL-CCNC: 11 U/L
ANION GAP SERPL CALC-SCNC: 4 MMOL/L (ref 0–18)
AST SERPL-CCNC: 22 U/L (ref 15–37)
ATRIAL RATE: 75 BPM
BASOPHILS # BLD AUTO: 0.03 X10(3) UL (ref 0–0.2)
BASOPHILS NFR BLD AUTO: 0.5 %
BILIRUB SERPL-MCNC: 0.5 MG/DL (ref 0.1–2)
BUN BLD-MCNC: 13 MG/DL (ref 9–23)
CALCIUM BLD-MCNC: 8.7 MG/DL (ref 8.5–10.1)
CHLORIDE SERPL-SCNC: 111 MMOL/L (ref 98–112)
CO2 SERPL-SCNC: 26 MMOL/L (ref 21–32)
CREAT BLD-MCNC: 0.5 MG/DL
EGFRCR SERPLBLD CKD-EPI 2021: 91 ML/MIN/1.73M2 (ref 60–?)
EOSINOPHIL # BLD AUTO: 0.22 X10(3) UL (ref 0–0.7)
EOSINOPHIL NFR BLD AUTO: 3.9 %
ERYTHROCYTE [DISTWIDTH] IN BLOOD BY AUTOMATED COUNT: 16.8 %
FASTING STATUS PATIENT QL REPORTED: YES
GLOBULIN PLAS-MCNC: 4.1 G/DL (ref 2.8–4.4)
GLUCOSE BLD-MCNC: 103 MG/DL (ref 70–99)
HCT VFR BLD AUTO: 30.6 %
HGB BLD-MCNC: 9.4 G/DL
IMM GRANULOCYTES # BLD AUTO: 0.02 X10(3) UL (ref 0–1)
IMM GRANULOCYTES NFR BLD: 0.4 %
LYMPHOCYTES # BLD AUTO: 1.94 X10(3) UL (ref 1–4)
LYMPHOCYTES NFR BLD AUTO: 34.3 %
MCH RBC QN AUTO: 25.3 PG (ref 26–34)
MCHC RBC AUTO-ENTMCNC: 30.7 G/DL (ref 31–37)
MCV RBC AUTO: 82.5 FL
MONOCYTES # BLD AUTO: 0.39 X10(3) UL (ref 0.1–1)
MONOCYTES NFR BLD AUTO: 6.9 %
NEUTROPHILS # BLD AUTO: 3.06 X10 (3) UL (ref 1.5–7.7)
NEUTROPHILS # BLD AUTO: 3.06 X10(3) UL (ref 1.5–7.7)
NEUTROPHILS NFR BLD AUTO: 54 %
OSMOLALITY SERPL CALC.SUM OF ELEC: 292 MOSM/KG (ref 275–295)
P AXIS: 31 DEGREES
P-R INTERVAL: 136 MS
PLATELET # BLD AUTO: 210 10(3)UL (ref 150–450)
POTASSIUM SERPL-SCNC: 3.8 MMOL/L (ref 3.5–5.1)
PROT SERPL-MCNC: 6.8 G/DL (ref 6.4–8.2)
Q-T INTERVAL: 408 MS
QRS DURATION: 88 MS
QTC CALCULATION (BEZET): 455 MS
R AXIS: 8 DEGREES
RBC # BLD AUTO: 3.71 X10(6)UL
SODIUM SERPL-SCNC: 141 MMOL/L (ref 136–145)
T AXIS: 50 DEGREES
VENTRICULAR RATE: 75 BPM
WBC # BLD AUTO: 5.7 X10(3) UL (ref 4–11)

## 2024-05-21 PROCEDURE — 93005 ELECTROCARDIOGRAM TRACING: CPT

## 2024-05-21 PROCEDURE — 80053 COMPREHEN METABOLIC PANEL: CPT | Performed by: FAMILY MEDICINE

## 2024-05-21 PROCEDURE — 85025 COMPLETE CBC W/AUTO DIFF WBC: CPT | Performed by: FAMILY MEDICINE

## 2024-05-21 PROCEDURE — 93010 ELECTROCARDIOGRAM REPORT: CPT | Performed by: INTERNAL MEDICINE

## 2024-05-21 PROCEDURE — 36415 COLL VENOUS BLD VENIPUNCTURE: CPT | Performed by: FAMILY MEDICINE

## 2024-05-22 ENCOUNTER — TELEPHONE (OUTPATIENT)
Dept: FAMILY MEDICINE CLINIC | Facility: CLINIC | Age: 89
End: 2024-05-22

## 2024-05-22 NOTE — TELEPHONE ENCOUNTER
Rosa from [facility] requesting call back from nurse regarding surgery requesting lab work, and to follow up.   Please advise     Phone number [phone]

## 2024-05-22 NOTE — TELEPHONE ENCOUNTER
Spoke w/ Rosa. She was asking for the pre-op date and if patient would be having EKG and blood work. Questions answered.

## 2024-05-28 ENCOUNTER — TELEPHONE (OUTPATIENT)
Dept: FAMILY MEDICINE CLINIC | Facility: CLINIC | Age: 89
End: 2024-05-28

## 2024-05-28 ENCOUNTER — OFFICE VISIT (OUTPATIENT)
Dept: FAMILY MEDICINE CLINIC | Facility: CLINIC | Age: 89
End: 2024-05-28

## 2024-05-28 VITALS
OXYGEN SATURATION: 96 % | TEMPERATURE: 98 F | DIASTOLIC BLOOD PRESSURE: 68 MMHG | HEART RATE: 90 BPM | HEIGHT: 60.5 IN | WEIGHT: 123.38 LBS | SYSTOLIC BLOOD PRESSURE: 122 MMHG | RESPIRATION RATE: 16 BRPM | BODY MASS INDEX: 23.6 KG/M2

## 2024-05-28 DIAGNOSIS — I47.10 SUPRAVENTRICULAR TACHYCARDIA (HCC): ICD-10-CM

## 2024-05-28 DIAGNOSIS — K56.609 SBO (SMALL BOWEL OBSTRUCTION) (HCC): ICD-10-CM

## 2024-05-28 DIAGNOSIS — D64.9 NORMOCYTIC ANEMIA: ICD-10-CM

## 2024-05-28 DIAGNOSIS — R53.1 GENERALIZED WEAKNESS: ICD-10-CM

## 2024-05-28 DIAGNOSIS — H30.22 INTERMEDIATE UVEITIS OF LEFT EYE: ICD-10-CM

## 2024-05-28 DIAGNOSIS — Z90.49 S/P SMALL BOWEL RESECTION: ICD-10-CM

## 2024-05-28 DIAGNOSIS — Z01.818 PREOPERATIVE CLEARANCE: Primary | ICD-10-CM

## 2024-05-28 PROCEDURE — 99215 OFFICE O/P EST HI 40 MIN: CPT | Performed by: FAMILY MEDICINE

## 2024-05-28 RX ORDER — PREDNISOLONE ACETATE 10 MG/ML
SUSPENSION/ DROPS OPHTHALMIC
COMMUNITY
Start: 2024-05-14

## 2024-05-28 NOTE — PROGRESS NOTES
PREOPERATIVE HISTORY AND PHYSICAL     Chief Complaint   Patient presents with    Pre-Op Exam     Vitreous biopsy /PPV on 6/6/2024 with Dr Ruben Block at [facility]        Pre-op clearance requested by:  Dr. Block  [facility]: [facility]    HPI (Indications.symptoms):     1. Preoperative clearance  2. Intermediate uveitis of left eye    Mana Grimaldo is a 88 year old female here for preop clearance prior to left eye surgery under general    Able to walk up several flights of stairs without chest pains, shortness of breath: yes  Any issues with anesthesia in the past: none    Denies chest pain, palpitations, syncope, shortness of breath, or wheezing    3. Supraventricular tachycardia (HCC)  -sees cardiology  -has already been cleared  -EKG completed    4. SBO (small bowel obstruction) (HCC)  5. S/P small bowel resection  6. Generalized weakness  -doing better  -energy improving  -complains of increased gas    7. Normocytic anemia  -hemoglobin low but relatively stable since last surgery  -energy levels improving        PAST HX (Comorbidities):   Patient Active Problem List   Diagnosis    Dizziness and giddiness    Supraventricular tachycardia (HCC)    Tachycardia, unspecified    Osteoarthritis of knee, unilateral    Age-related osteoporosis without current pathological fracture       Past Surgical History:   Procedure Laterality Date    Cataract      Knee replacement surgery Bilateral     Laparoscopic cholecystectomy      Other      varicositis re moved from right leg        MEDS:   atropine 1 % Ophthalmic Solution Place 1 drop into the left eye 2 (two) times daily.      difluprednate 0.05 % Ophthalmic Emulsion Place 1 drop into the left eye 4 (four) times daily.      verapamil 120 MG Oral Tab CR Take 1 tablet (120 mg total) by mouth 2 (two) times daily. 180 tablet 3    Naproxen Sodium 220 MG Oral Cap Take 220 mg by mouth as needed.           SOCIAL HISTORY:  Social History      Socioeconomic History    Marital status:      Spouse name: Not on file    Number of children: Not on file    Years of education: Not on file    Highest education level: Not on file   Occupational History    Not on file   Tobacco Use    Smoking status: Never    Smokeless tobacco: Never   Vaping Use    Vaping status: Never Used   Substance and Sexual Activity    Alcohol use: Not Currently    Drug use: Never    Sexual activity: Not on file   Other Topics Concern     Service Not Asked    Blood Transfusions Not Asked    Caffeine Concern Yes     Comment: 1 cup of coffee daily    Occupational Exposure Not Asked    Hobby Hazards Not Asked    Sleep Concern Not Asked    Stress Concern No    Weight Concern No    Special Diet No    Back Care Not Asked    Exercise No     Comment: daily walking    Bike Helmet Not Asked    Seat Belt Yes    Self-Exams Not Asked   Social History Narrative    Not on file     Social Determinants of Health     Financial Resource Strain: Not on file   Food Insecurity: Not on file   Transportation Needs: Not on file   Physical Activity: Not on file   Stress: Not on file   Social Connections: Not on file   Housing Stability: Low Risk  (5/9/2024)    Received from [facility]    Housing Stability     Mortgage Payment Concerns?: Not on file     Number of Places Lived in the Last Year: Not on file     Unstable Housing?: Not on file       No family history on file.    ALLERGIES:   Allergies   Allergen Reactions    Diltiazem UNKNOWN         ROS:  Other than above, all other ROS is negative.                         PHYSICAL EXAMINATION:  /68 (BP Location: Left arm, Patient Position: Sitting)   Pulse 90   Temp 97.5 °F (36.4 °C) (Temporal)   Resp 16   Ht 5' 0.5\" (1.537 m)   Wt 123 lb 6.4 oz (56 kg)   SpO2 96%   BMI 23.70 kg/m²     Gen: NAD, alert and oriented x 3  HEENT: NCAT, PERRL, no exudates  Neck: supple, no LAD, no masses  Pulm: CTAB, no wheezing  CV: RRR,  no murmurs  Abd: soft, ND, NT, +BS  Ext: full ROM, normal strength, no edema    EKG:   Normal sinus rhythm   Left atrial enlargement   Borderline ECG   No previous ECGs found in [facility]   Confirmed by OPHELIA HERNANDEZ JORDAN ([id number]) on 5/21/2024 1:05:49 PM     ASSESSMENT/PLAN:    1. Preoperative clearance  2. Intermediate uveitis of left eye    -Patient is at acceptable risk for planned procedure and is optimized for planned procedure at [facility] under general anesthesia  -avoid all nsaid/blood thinner products for 7 days prior to the procedure  -NPO after midnight day of procedure  -preop labs stable  -EKG unremarkable  -cleared by cardiology    3. Supraventricular tachycardia (HCC)  -stable  -will attach clearance letter from cardiology  -EKG unremarkable    4. SBO (small bowel obstruction) (HCC)  5. S/P small bowel resection  6. Generalized weakness  -slow continued improvement  -reviewed continued expectant management  -probiotic daily x 4-6 wks for gas    7. Normocytic anemia  -stable  -will recheck labs in 6 wks  -see me in 8 wks to review    - Iron And Tibc  - Ferritin  - Vitamin B12  - Folic Acid Serum (Folate)  - CBC With Differential With Platelet        Will fax note to preop and Dr. Block    I spent a total of 40 minutes, more than half of which was spent counseling/coordinating care regarding preop clearance

## 2024-05-28 NOTE — PATIENT INSTRUCTIONS
Va a [facility] para lazara en 6 semanas    Empeza probiotico diario por 6 semanas    Regresa en 2 meses

## 2024-06-17 ENCOUNTER — TELEPHONE (OUTPATIENT)
Dept: FAMILY MEDICINE CLINIC | Facility: CLINIC | Age: 89
End: 2024-06-17

## 2024-06-17 NOTE — TELEPHONE ENCOUNTER
Spoke w/Dr. Jaimes and then spoke w/daughter. He suggests IC or ER as the patient likely needs an US and to schedule one would be too far out if patient is in serious pain. Daughter of Patient verbalized agreement and understanding.

## 2024-06-17 NOTE — TELEPHONE ENCOUNTER
URGENT SICK CALL    Mana Oviedo DeArce  LOV: 5/28/2024     Patient experiencing painful hard ball on stomach left side, patient can't get up because its very uncomfortable. Patient had hernia surgery 3 months ago.   Duration/Onset of symptoms: 1 week  No appointment available  / Scheduled an appointment for 8/5/2024        Please call patient back at your earliest convenience.

## 2024-07-07 ENCOUNTER — HOSPITAL ENCOUNTER (OUTPATIENT)
Dept: MRI IMAGING | Age: 89
End: 2024-07-07
Attending: OPHTHALMOLOGY
Payer: MEDICAID

## 2024-07-07 ENCOUNTER — HOSPITAL ENCOUNTER (OUTPATIENT)
Dept: MRI IMAGING | Age: 89
Discharge: HOME | End: 2024-07-07
Attending: OPHTHALMOLOGY
Payer: MEDICAID

## 2024-07-07 DIAGNOSIS — H30.20 INTERMEDIATE UVEITIS, UNSPECIFIED LATERALITY: ICD-10-CM

## 2024-07-07 PROCEDURE — 70543 MRI ORBT/FAC/NCK W/O &W/DYE: CPT | Performed by: OPHTHALMOLOGY

## 2024-07-07 PROCEDURE — 70553 MRI BRAIN STEM W/O & W/DYE: CPT | Performed by: OPHTHALMOLOGY

## 2024-07-07 PROCEDURE — A9575 INJ GADOTERATE MEGLUMI 0.1ML: HCPCS

## 2024-07-07 RX ORDER — GADOTERATE MEGLUMINE 376.9 MG/ML
15 INJECTION INTRAVENOUS
Status: COMPLETED | OUTPATIENT
Start: 2024-07-07 | End: 2024-07-07

## 2024-07-07 RX ADMIN — GADOTERATE MEGLUMINE 12 ML: 376.9 INJECTION INTRAVENOUS at 08:41:00

## 2024-08-09 LAB
% SATURATION: 9 % (CALC) (ref 16–45)
ABSOLUTE BASOPHILS: 29 CELLS/UL (ref 0–200)
ABSOLUTE EOSINOPHILS: 122 CELLS/UL (ref 15–500)
ABSOLUTE LYMPHOCYTES: 1444 CELLS/UL (ref 850–3900)
ABSOLUTE MONOCYTES: 319 CELLS/UL (ref 200–950)
ABSOLUTE NEUTROPHILS: 3886 CELLS/UL (ref 1500–7800)
BASOPHILS: 0.5 %
EOSINOPHILS: 2.1 %
FERRITIN: 140 NG/ML (ref 16–288)
FOLATE, SERUM: 8.7 NG/ML
HEMATOCRIT: 36.7 % (ref 35–45)
HEMOGLOBIN: 11.1 G/DL (ref 11.7–15.5)
IRON BINDING CAPACITY: 268 MCG/DL (CALC) (ref 250–450)
IRON, TOTAL: 24 MCG/DL (ref 45–160)
LYMPHOCYTES: 24.9 %
MCH: 23.9 PG (ref 27–33)
MCHC: 30.2 G/DL (ref 32–36)
MCV: 79.1 FL (ref 80–100)
MONOCYTES: 5.5 %
MPV: 12.4 FL (ref 7.5–12.5)
NEUTROPHILS: 67 %
PLATELET COUNT: 187 THOUSAND/UL (ref 140–400)
RDW: 14.7 % (ref 11–15)
RED BLOOD CELL COUNT: 4.64 MILLION/UL (ref 3.8–5.1)
VITAMIN B12: 357 PG/ML (ref 200–1100)
WHITE BLOOD CELL COUNT: 5.8 THOUSAND/UL (ref 3.8–10.8)

## 2024-08-13 ENCOUNTER — OFFICE VISIT (OUTPATIENT)
Dept: FAMILY MEDICINE CLINIC | Facility: CLINIC | Age: 89
End: 2024-08-13
Payer: MEDICAID

## 2024-08-13 VITALS
BODY MASS INDEX: 23.68 KG/M2 | HEIGHT: 60 IN | TEMPERATURE: 98 F | SYSTOLIC BLOOD PRESSURE: 118 MMHG | RESPIRATION RATE: 16 BRPM | OXYGEN SATURATION: 95 % | HEART RATE: 75 BPM | WEIGHT: 120.63 LBS | DIASTOLIC BLOOD PRESSURE: 66 MMHG

## 2024-08-13 DIAGNOSIS — H30.22 INTERMEDIATE UVEITIS OF LEFT EYE: Primary | ICD-10-CM

## 2024-08-13 DIAGNOSIS — I47.10 SUPRAVENTRICULAR TACHYCARDIA (HCC): ICD-10-CM

## 2024-08-13 DIAGNOSIS — E53.8 B12 DEFICIENCY: ICD-10-CM

## 2024-08-13 DIAGNOSIS — R42 VERTIGO: ICD-10-CM

## 2024-08-13 PROCEDURE — 96372 THER/PROPH/DIAG INJ SC/IM: CPT | Performed by: FAMILY MEDICINE

## 2024-08-13 PROCEDURE — 99215 OFFICE O/P EST HI 40 MIN: CPT | Performed by: FAMILY MEDICINE

## 2024-08-13 RX ORDER — SULFAMETHOXAZOLE AND TRIMETHOPRIM 800; 160 MG/1; MG/1
1 TABLET ORAL 2 TIMES DAILY
COMMUNITY

## 2024-08-13 RX ORDER — CYANOCOBALAMIN 1000 UG/ML
1000 INJECTION, SOLUTION INTRAMUSCULAR; SUBCUTANEOUS
Status: SHIPPED | OUTPATIENT
Start: 2024-08-13

## 2024-08-13 RX ADMIN — CYANOCOBALAMIN 1000 MCG: 1000 INJECTION, SOLUTION INTRAMUSCULAR; SUBCUTANEOUS at 11:47:00

## 2024-08-13 NOTE — PROGRESS NOTES
Mana Grimaldo is a 88 year old female here for   Chief Complaint   Patient presents with    Follow - Up     Here to review labs done 8/8/24     Dizziness       HPI:       1. Intermediate uveitis of left eye  -better s/p surgery, though vision at baseline isn't great    2. Supraventricular tachycardia (HCC)  -no new issues  -denies cp or palpitations    3. Vertigo  -complains of spinning sensation - worse when lying down  -lasts for seconds then gets better  -does not occur during exertion  -rarely when standing  -no associated nausea, tinnintus or hearing loss    4. B12 deficiency  -reviewed recent labs        HISTORY:  Past Medical History:    FHx: supraventricular tachycardia    Hypertension      Past Surgical History:   Procedure Laterality Date    Cataract      Knee replacement surgery Bilateral     Laparoscopic cholecystectomy      Other      varicositis re moved from right leg       History reviewed. No pertinent family history.   Social History:   Social History     Socioeconomic History    Marital status:    Tobacco Use    Smoking status: Never    Smokeless tobacco: Never   Vaping Use    Vaping status: Never Used   Substance and Sexual Activity    Alcohol use: Not Currently    Drug use: Never   Other Topics Concern    Caffeine Concern No     Comment: 1 cup of coffee daily    Stress Concern No    Weight Concern No    Special Diet No    Exercise No     Comment: daily walking    Seat Belt Yes     Social Determinants of Health      Received from [facility]    Housing Stability        Medications (Active prior to today's visit):  Current Outpatient Medications   Medication Sig Dispense Refill    sulfamethoxazole-trimethoprim -160 MG Oral Tab per tablet Take 1 tablet by mouth 2 (two) times daily.      atropine 1 % Ophthalmic Solution Place 1 drop into the left eye 2 (two) times daily.      verapamil 120 MG Oral Tab CR Take 1 tablet (120 mg total) by mouth 2 (two) times  daily. 180 tablet 3    Naproxen Sodium 220 MG Oral Cap Take 220 mg by mouth as needed.      prednisoLONE 1 % Ophthalmic Suspension INSTILL 1 DROP INTO LEFT EYE AS DIRECTED 8 TIMES DAILY. (Patient not taking: Reported on 5/28/2024)      apixaban 2.5 MG Oral Tab Take 1 tablet (2.5 mg total) by mouth 2 (two) times daily. (Patient not taking: Reported on 5/28/2024)      difluprednate 0.05 % Ophthalmic Emulsion Place 1 drop into the left eye 4 (four) times daily. (Patient not taking: Reported on 8/13/2024)      B Complex-C-Folic Acid (HM VITAMIN B COMPLEX/VITAMIN C) Oral Tab Take 1 tablet by mouth daily. (Patient not taking: Reported on 5/28/2024)         Allergies:  Allergies   Allergen Reactions    Diltiazem UNKNOWN         ROS:   See HPI for relevant ROS    --GEN: No other complaints  --HEENT: No other complaints  --RESP: No other complaints  --CV: No other complaints  --GI: No other complaints  --MSK: No other complaints    All other systems reviewed are negative    PHYSICAL EXAM:   /66 (BP Location: Left arm, Patient Position: Sitting, Cuff Size: adult)   Pulse 75   Temp 97.9 °F (36.6 °C) (Temporal)   Resp 16   Ht 5' (1.524 m)   Wt 120 lb 9.6 oz (54.7 kg)   SpO2 95%   BMI 23.55 kg/m²     Gen: NAD  HEENT: NCAT, pupils equal and round  Pulm: CTAB, no wheezing  CV: RRR  Ext: full ROM  Psych: normal affect     ASSESSMENT/PLAN:     1. Intermediate uveitis of left eye  -better  -f/u with ophtho as planned    2. Supraventricular tachycardia (HCC)  -stable, hr stable today  -c/w verapamil  -f/u with cardiology as planned    3. Vertigo  -mild symptoms\  -intetrested in therapy/exercises - will refer  -f/u here prn    - OP REFERRAL TO [facility]    4. B12 deficiency  -start b12 injections monthly x 3-4 months  -start 1000mcg daily as well  -f/u in 3 months  -will recheck labs at that time    - cyanocobalamin (Vitamin B12) 1000 MCG/ML injection 1,000 mcg        Chronic Conditions:    No  problem-specific Assessment & Plan notes found for this encounter.       Health Maintenance:  Health Maintenance   Topic Date Due    Zoster Vaccines (1 of 2) Never done    Pneumococcal Vaccine: 65+ Years (2 of 2 - PCV) 09/12/2018    COVID-19 Vaccine (1 - 2023-24 season) Never done    Influenza Vaccine (1) 10/01/2024    Annual Physical  04/15/2025    Annual Depression Screening  Completed    Fall Risk Screening (Annual)  Completed               The patient is asked to return in 3 months.    Orders This Visit:  No orders of the defined types were placed in this encounter.      Meds This Visit:  Requested Prescriptions      No prescriptions requested or ordered in this encounter       Imaging & Referrals:  OP REFERRAL TO [facility]     AYLIN CELESTE MD    I spent a total of 40 minutes, more than half of which was spent counseling/coordinating care regarding vertigo, tachy, b12, eye

## 2024-08-13 NOTE — PATIENT INSTRUCTIONS
Empeza cherry tripp pastilla diario (sin receta)    Empeza B12 1000mcg diario (sin receta)    Llama para bertha de therapia    Regresa en 3 meses

## 2024-08-21 ENCOUNTER — OFFICE VISIT (OUTPATIENT)
Dept: PHYSICAL THERAPY | Age: 89
End: 2024-08-21
Attending: FAMILY MEDICINE
Payer: MEDICAID

## 2024-08-21 DIAGNOSIS — R42 VERTIGO: Primary | ICD-10-CM

## 2024-08-21 PROCEDURE — 95992 CANALITH REPOSITIONING PROC: CPT

## 2024-08-21 PROCEDURE — 97161 PT EVAL LOW COMPLEX 20 MIN: CPT

## 2024-08-21 NOTE — PROGRESS NOTES
VESTIBULAR EVALUATION:     Diagnosis:   Vertigo (R42)      Rehab Dx: Left posterior canal BPPV   Referring Provider: [facility] Jaimes  Date of Evaluation:    8/21/2024    Precautions:  None Next MD visit:   none scheduled  Date of Surgery: n/a     PATIENT SUMMARY   Mana Grimaldo is a 88 year old female who presents to therapy today with reports of intermittent positional vertigo x 2 years. States the vertigo goes away for a month or 2 then comes back. Mentioned the dizziness to GP at physical and he recommended PT. Usually only has the dizziness when she lays on her side. Lasting few seconds.      Symptoms with cough/sneeze or loud noise: No  Falls: No  Hx of migraines: No  Hx of vision issue: Yes: uveitis had surgery June 6, 2024  Hx of hearing issues: hearing loss    Dizziness: Current 0/10, Best 0/10, Worst 5/10  Quality: spinning  Frequency/Duration:  few seconds  Aggravates: Supine to/from sit and Rolling  Relieves: Not moving    Headache: denies    Cervical pain: denies  Dizziness Handicap Inventory (DHI): 52/100     Current functional limitations include difficulty with Supine to/from sit and Rolling  Social history:  lives with daughter deander Adair describes prior level of function able to perform bed mobility without dizziness  Pt goals include get rid of dizziness  Past medical history was reviewed with Mana. Significant findings include   Past Medical History:    FHx: supraventricular tachycardia    Hypertension          ASSESSMENT  Mana presents to physical therapy evaluation with primary c/o positional vertigo. The results of the objective tests and measures show + left Bebe hallpike--10 sec of upbeating torsional nystagmus.  Functional deficits include but are not limited to difficulty with Supine to/from sit and Rolling.  Signs and symptoms are consistent with diagnosis of Left posterior canal BPPV. Pt and PT discussed evaluation findings, pathology, POC and HEP.  Pt  voiced understanding and performs HEP correctly. Skilled Physical Therapy is medically necessary to address the above impairments and reach functional goals.    OBJECTIVE:   Physical Exam:  Posture/Observation: rounded shoulder posture     Coordination Testing:   Finger to Nose: WNL   Pronation/supination: WNL   Toe tapping: WNL     Cervical spine ROM: WNL   Adverse neuro signs with ROM: yes no: no     Occulomotor & Vestibulo-Ocular Exam:  NT this visit. To be assessed at future visit as indicated    Positional Testing:   Bebe-Hallpike: R neg, L pos- 10 sec upbeating torsional nystagmus   Roll Test (HC): neg     Postural Control:   NT this visit. To be assessed at future visit as indicated       Functional Mobility:   Gait: pt ambulates on level ground with assistive device of st cane and decreased dean .     Today's Treatment and Response: PT asim. Yoanna x 2 rounds. No nystagmus or dizziness with second round  Pt education was provided on exam findings, treatment diagnosis, treatment plan, expectations, and prognosis. Pt was also provided recommendations for activity modifications, possible dizziness after evaluation, and post Epley precautions .   Patient was instructed in and issued a HEP for: post epley precautions    Charges: PT Eval Low Complexity, CRM 1 (15)      Total Timed Treatment: 40 min     Total Treatment Time: 40 min     Based on clinical rationale and outcome measures, this evaluation involved Low Complexity decision making   PLAN OF CARE:    Goals: (to be met in 8 visits)  Pt to be able to make quick head turns w/o symptoms of dizziness.   Pt to be able to do bed mobility and transfer w/o gaurding or dizziness.   Pt to be able to demonstrate negative Hallpike test to facilitate ADL's without symptoms.  Pt to be able to bend over or look down without dizziness  Pt to report no dizziness x 30 days      Frequency / Duration: Patient will be seen for 1 x/week or a total of 8 visits over a 90 day  period. Treatment will include: home exercise program development and instruction, patient/family education, balance training, canalith repositioning maneuver, and gait training.     Education or treatment limitation: Communication --pt speaks Greenlandic only  Rehab Potential: good     Patient/Family/Caregiver was advised of these findings, precautions, and treatment options and has agreed to actively participate in planning and for this course of care.     Thank you for your referral. Please co-sign or sign and return this letter via fax as soon as possible to [phone]. If you have any questions, please contact me at Dept: [phone]    Sincerely,  Electronically signed by therapist: Audi De Luna, PT  Physician's certification required: Yes  I certify the need for these services furnished under this plan of treatment and while under my care.    X___________________________________________________ Date____________________    Certification From: 8/21/2024  To:11/19/2024

## 2024-08-28 ENCOUNTER — OFFICE VISIT (OUTPATIENT)
Dept: PHYSICAL THERAPY | Age: 89
End: 2024-08-28
Attending: FAMILY MEDICINE
Payer: MEDICAID

## 2024-08-28 PROCEDURE — 97112 NEUROMUSCULAR REEDUCATION: CPT

## 2024-08-28 PROCEDURE — 95992 CANALITH REPOSITIONING PROC: CPT

## 2024-08-28 NOTE — PROGRESS NOTES
Diagnosis:   Vertigo (R42)      Rehab Dx: Left posterior canal BPPV      Referring Provider: Elder Jaimes  Date of Evaluation:    08/21/2024    Precautions:  None Next MD visit:   none scheduled  Date of Surgery: n/a   Insurance Primary/Secondary: BL[employer]OSS MEDICAID / N/A     # Auth Visits: 6 until 10/21/24            Subjective: states she is feeling a lot better with her dizziness. Has not had any dizziness since last session. Slept on left and right side this week without dizziness.     Pain: na  Dizziness: 0/10      Objective:   Positional Testing:   Brave-Hallpike: R neg, L neg   Roll Test (HC): neg       Postural Control:    Romberg: EO >30 sec, EC >30 sec   Romberg on Foam: EO >30 sec, EC 16 sec   Tandem Stance: R back EO >17 sec, EC 9 sec; L back EO >17 sec, EC 6 sec        Assessment: Left BPPV has resolved with negative Brave Hallpike on testing today. Did perform 1 round of Epley today due to only being 1 week from correction. Posture control exam completed and WNL.      Goals:    (to be met in 8 visits)  Pt to be able to make quick head turns w/o symptoms of dizziness. Met   Pt to be able to do bed mobility and transfer w/o gaurding or dizziness. Met  Pt to be able to demonstrate negative Hallpike test to facilitate ADL's without symptoms. Met   Pt to be able to bend over or look down without dizziness. met  Pt to report no dizziness x 30 days. In progress    Plan: hold chart open until 10/21/24 incase dizziness returns  Date: 8/28/2024  TX#: 2/6 Date:                 TX#: 3/ Date:                 TX#: 4/ Date:                 TX#: 5/ Date:   Tx#: 6/   Epley x 1 round       Posture control exam                         Charges: CRM 1 (10) NReed 1(15)       Total Timed Treatment: 25 min  Total Treatment Time: 25 min

## 2024-09-04 ENCOUNTER — APPOINTMENT (OUTPATIENT)
Dept: PHYSICAL THERAPY | Age: 89
End: 2024-09-04
Attending: FAMILY MEDICINE
Payer: MEDICAID

## 2024-09-12 ENCOUNTER — APPOINTMENT (OUTPATIENT)
Dept: PHYSICAL THERAPY | Age: 89
End: 2024-09-12
Attending: FAMILY MEDICINE
Payer: MEDICAID

## 2024-09-16 ENCOUNTER — NURSE ONLY (OUTPATIENT)
Dept: FAMILY MEDICINE CLINIC | Facility: CLINIC | Age: 89
End: 2024-09-16
Payer: MEDICAID

## 2024-09-16 DIAGNOSIS — E53.8 B12 DEFICIENCY: Primary | ICD-10-CM

## 2024-09-16 RX ORDER — CYANOCOBALAMIN 1000 UG/ML
1000 INJECTION, SOLUTION INTRAMUSCULAR; SUBCUTANEOUS ONCE
Status: COMPLETED | OUTPATIENT
Start: 2024-09-16 | End: 2024-09-16

## 2024-09-16 RX ADMIN — CYANOCOBALAMIN 1000 MCG: 1000 INJECTION, SOLUTION INTRAMUSCULAR; SUBCUTANEOUS at 09:42:00

## 2024-09-18 ENCOUNTER — APPOINTMENT (OUTPATIENT)
Dept: PHYSICAL THERAPY | Age: 89
End: 2024-09-18
Attending: FAMILY MEDICINE
Payer: MEDICAID

## 2024-09-25 ENCOUNTER — APPOINTMENT (OUTPATIENT)
Dept: PHYSICAL THERAPY | Age: 89
End: 2024-09-25
Attending: FAMILY MEDICINE
Payer: MEDICAID

## 2024-10-02 ENCOUNTER — APPOINTMENT (OUTPATIENT)
Dept: PHYSICAL THERAPY | Age: 89
End: 2024-10-02
Attending: FAMILY MEDICINE
Payer: MEDICAID

## 2024-10-09 ENCOUNTER — APPOINTMENT (OUTPATIENT)
Dept: PHYSICAL THERAPY | Age: 89
End: 2024-10-09
Attending: FAMILY MEDICINE
Payer: MEDICAID

## 2024-10-16 ENCOUNTER — APPOINTMENT (OUTPATIENT)
Dept: PHYSICAL THERAPY | Age: 89
End: 2024-10-16
Attending: FAMILY MEDICINE
Payer: MEDICAID

## 2024-10-17 ENCOUNTER — NURSE ONLY (OUTPATIENT)
Dept: FAMILY MEDICINE CLINIC | Facility: CLINIC | Age: 89
End: 2024-10-17
Payer: MEDICAID

## 2024-10-23 ENCOUNTER — APPOINTMENT (OUTPATIENT)
Dept: PHYSICAL THERAPY | Age: 89
End: 2024-10-23
Attending: FAMILY MEDICINE
Payer: MEDICAID

## 2024-11-12 ENCOUNTER — OFFICE VISIT (OUTPATIENT)
Dept: FAMILY MEDICINE CLINIC | Facility: CLINIC | Age: 89
End: 2024-11-12
Payer: MEDICAID

## 2024-11-12 VITALS
BODY MASS INDEX: 23.22 KG/M2 | HEIGHT: 60.5 IN | DIASTOLIC BLOOD PRESSURE: 62 MMHG | SYSTOLIC BLOOD PRESSURE: 116 MMHG | HEART RATE: 68 BPM | OXYGEN SATURATION: 94 % | TEMPERATURE: 97 F | WEIGHT: 121.38 LBS

## 2024-11-12 DIAGNOSIS — R42 VERTIGO: Primary | ICD-10-CM

## 2024-11-12 DIAGNOSIS — Z23 NEED FOR VACCINATION: ICD-10-CM

## 2024-11-12 DIAGNOSIS — H30.22 INTERMEDIATE UVEITIS OF LEFT EYE: ICD-10-CM

## 2024-11-12 DIAGNOSIS — I47.10 SUPRAVENTRICULAR TACHYCARDIA (HCC): ICD-10-CM

## 2024-11-12 DIAGNOSIS — E53.8 B12 DEFICIENCY: ICD-10-CM

## 2024-11-12 PROCEDURE — 90471 IMMUNIZATION ADMIN: CPT | Performed by: FAMILY MEDICINE

## 2024-11-12 PROCEDURE — 90662 IIV NO PRSV INCREASED AG IM: CPT | Performed by: FAMILY MEDICINE

## 2024-11-12 PROCEDURE — 99215 OFFICE O/P EST HI 40 MIN: CPT | Performed by: FAMILY MEDICINE

## 2024-11-12 PROCEDURE — 96372 THER/PROPH/DIAG INJ SC/IM: CPT | Performed by: FAMILY MEDICINE

## 2024-11-12 RX ORDER — CYANOCOBALAMIN 1000 UG/ML
1000 INJECTION, SOLUTION INTRAMUSCULAR; SUBCUTANEOUS ONCE
Status: COMPLETED | OUTPATIENT
Start: 2024-11-12 | End: 2024-11-12

## 2024-11-12 RX ADMIN — CYANOCOBALAMIN 1000 MCG: 1000 INJECTION, SOLUTION INTRAMUSCULAR; SUBCUTANEOUS at 11:46:00

## 2024-11-12 NOTE — PROGRESS NOTES
Mana Grimaldo is a 89 year old female here for   Chief Complaint   Patient presents with    Follow - Up    Bloating     Burping        HPI:       1. Vertigo  -better s/p PT    2. Supraventricular tachycardia (HCC)  -has had 2 significant episodes - one requiring ER visit  -has followed with cardiology recently  -verapamil increased to 180mg - hasn't started yet    3. B12 deficiency  -better with injections - energy levels improving    4. Intermediate uveitis of left eye  -has upcoming appt for f/u next month    5. Need for vaccination  -due for:     - High Dose Fluzone trivalent influenza, 65 yrs+ PFS [[id number]]        HISTORY:  Past Medical History:    FHx: supraventricular tachycardia    Hypertension      Past Surgical History:   Procedure Laterality Date    Cataract      Knee replacement surgery Bilateral     Laparoscopic cholecystectomy      Other      varicositis re moved from right leg       History reviewed. No pertinent family history.   Social History:   Social History     Socioeconomic History    Marital status:    Tobacco Use    Smoking status: Never    Smokeless tobacco: Never   Vaping Use    Vaping status: Never Used   Substance and Sexual Activity    Alcohol use: Not Currently    Drug use: Never   Other Topics Concern    Caffeine Concern Yes     Comment: 1 cup of coffee daily    Stress Concern No    Weight Concern No    Special Diet No    Exercise No     Comment: daily walking    Seat Belt Yes     Social Drivers of Health      Received from [facility]    Housing Stability        Medications (Active prior to today's visit):  Current Outpatient Medications   Medication Sig Dispense Refill    sulfamethoxazole-trimethoprim -160 MG Oral Tab per tablet Take 1 tablet by mouth 2 (two) times daily.      difluprednate 0.05 % Ophthalmic Emulsion Place 1 drop into the left eye 4 (four) times daily.      verapamil 120 MG Oral Tab CR Take 1 tablet (120 mg total) by mouth 2  (two) times daily. 180 tablet 3    B Complex-C-Folic Acid (HM VITAMIN B COMPLEX/VITAMIN C) Oral Tab Take 1 tablet by mouth daily.      prednisoLONE 1 % Ophthalmic Suspension INSTILL 1 DROP INTO LEFT EYE AS DIRECTED 8 TIMES DAILY. (Patient not taking: Reported on 11/12/2024)      apixaban 2.5 MG Oral Tab Take 1 tablet (2.5 mg total) by mouth 2 (two) times daily. (Patient not taking: Reported on 11/12/2024)      atropine 1 % Ophthalmic Solution Place 1 drop into the left eye 2 (two) times daily. (Patient not taking: Reported on 11/12/2024)      Naproxen Sodium 220 MG Oral Cap Take 220 mg by mouth as needed. (Patient not taking: Reported on 11/12/2024)         Allergies:  Allergies[1]      ROS:   See HPI for relevant ROS    --GEN: No other complaints  --HEENT: No other complaints  --RESP: No other complaints  --CV: No other complaints  --GI: No other complaints  --MSK: No other complaints    All other systems reviewed are negative    PHYSICAL EXAM:   /62 (BP Location: Left arm, Patient Position: Sitting, Cuff Size: adult)   Pulse 68   Temp 97.2 °F (36.2 °C) (Temporal)   Ht 5' 0.5\" (1.537 m)   Wt 121 lb 6.4 oz (55.1 kg)   SpO2 94%   BMI 23.32 kg/m²     Gen: NAD  HEENT: NCAT, pupils equal and round  Pulm: CTAB, no wheezing  CV: RRR  Ext: full ROM  Psych: normal affect     ASSESSMENT/PLAN:     1. Vertigo  -better, c/w home exercises prn    2. Supraventricular tachycardia (HCC)  -increase verapamil as planned  -f/u with cardiology as planned    3. B12 deficiency  -repeat b12 injection today  -then start 1000mcg daily  -recheck level in 6 months    4. Intermediate uveitis of left eye  -f/u with ophtho as planned    5. Need for vaccination  - High Dose Fluzone trivalent influenza, 65 yrs+ PFS [[id number]]        Chronic Conditions:    No problem-specific Assessment & Plan notes found for this encounter.       Health Maintenance:  Health Maintenance   Topic Date Due    Zoster Vaccines (1 of 2) Never done     Pneumococcal Vaccine: 65+ Years (2 of 2 - PCV) 09/12/2018    COVID-19 Vaccine (1 - 2023-24 season) 11/17/2039 (Originally 9/1/2024)    Annual Physical  04/15/2025    Influenza Vaccine  Completed    Annual Depression Screening  Completed    Fall Risk Screening (Annual)  Completed               The patient is asked to return in 6 months.    Orders This Visit:  Orders Placed This Encounter   Procedures    High Dose Fluzone trivalent influenza, 65 yrs+ PFS [31084]       Meds This Visit:  Requested Prescriptions      No prescriptions requested or ordered in this encounter       Imaging & Referrals:  INFLUENZA VAC HIGH DOSE PRSV FREE     AYLIN CELESTE MD    I spent a total of 40 minutes, more than half of which was spent counseling/coordinating care regarding vertigo, svt, b12, eye       [1]   Allergies  Allergen Reactions    Diltiazem UNKNOWN

## 2024-11-12 NOTE — PATIENT INSTRUCTIONS
Continua B12 vitamina diario    Empeza probiotico diario (generico similar de Align esta terrence) por 4 - 6 semanas    Regresa en Ricks, antes si necessita

## 2025-03-24 ENCOUNTER — TELEPHONE (OUTPATIENT)
Dept: FAMILY MEDICINE CLINIC | Facility: CLINIC | Age: 89
End: 2025-03-24

## 2025-03-24 NOTE — TELEPHONE ENCOUNTER
Mana Oviedo DeAe  LOV: 11/12/2024       Mana calling experiencing:swollen red foot (right) more at night time   Duration/Onset of symptom:2-3 weeks  Appointment Offered: no appointments available soon     [place] Speaking, aware to answer out of state calls

## 2025-03-24 NOTE — TELEPHONE ENCOUNTER
Called and spoke to patient via language line. Patient has been experiencing redness and swelling in her right leg for the past 2-3 weeks. She states the swelling and redness starts at her knee and goes down to her foot. She does have pain when she is walking. It is only the right leg and does say it gets worse at night. The patient states she does not see any cuts or injuries to this area, no drainage. Patient reports no fever. The patient was asked to touch her skin where it is red/swollen and she states the skin feels \"hot\". She believed it could be varicose veins. Due to these symptoms I advised Her to go to the immediate care. Explained this could be an infection or blood clot and needs to be addressed as soon as possible. Patient understands and will seek medical attention.

## 2025-04-04 ENCOUNTER — TELEPHONE (OUTPATIENT)
Dept: FAMILY MEDICINE CLINIC | Facility: CLINIC | Age: 89
End: 2025-04-04

## 2025-04-04 NOTE — TELEPHONE ENCOUNTER
Patient scheduled an appointment through [facility] for May 29th with Dr. Elder Jaimes and mentioned in the appointment notes \"has inflamed vein, superficial clot   immediate care on 3/31/25 and [facility] ultrasound on 3/31/25\"    Please advise if the patient can wait for the appointment or if they need to be seen sooner.    Future Appointments   Date Time Provider Department Center   5/29/2025 10:00 AM Elder Jaimes MD [facility] 28 [facility]

## 2025-04-04 NOTE — TELEPHONE ENCOUNTER
Called and spoke to patient and daughter. Patient was seen at [facility] on 3/31 for a superficial blood clot in her leg. Patient states she is doing better now. She was advised to follow up with PCP. Appointment made for Monday 4/7. Advised to go back to emergency room with any worsening symptoms (redness, swelling, skin hot to touch, color change). Advised family to call [facility] to obtain visit information. Office fax number given

## 2025-04-07 ENCOUNTER — TELEMEDICINE (OUTPATIENT)
Dept: FAMILY MEDICINE CLINIC | Facility: CLINIC | Age: 89
End: 2025-04-07
Payer: MEDICAID

## 2025-04-07 DIAGNOSIS — I80.01 THROMBOPHLEBITIS OF SUPERFICIAL VEINS OF RIGHT LOWER EXTREMITY: Primary | ICD-10-CM

## 2025-04-07 RX ORDER — FUROSEMIDE 20 MG/1
20 TABLET ORAL DAILY
Qty: 5 TABLET | Refills: 1 | Status: SHIPPED | OUTPATIENT
Start: 2025-04-07

## 2025-04-07 NOTE — PROGRESS NOTES
Virtual/Telephone Check-In    Mana Grimaldo is a 89 year old female here today for a telemedicine audio and video visit.      HPI:       1. Thrombophlebitis of superficial veins of right lower extremity  -was seen in  at [facility] last week for b/l foot swelling, worse on right  -had US which showed thrombophlebitis of right foot  -since then, has been using warm compress with some good effect  -pain improving  -swelling in both feet is about the same      Health Maintenance Due   Topic    Zoster Vaccines (1 of 2)    Pneumococcal Vaccine: 50+ Years (2 of 2 - PCV)    Annual Physical          ASSESSMENT/PLAN:     1. Thrombophlebitis of superficial veins of right lower extremity  -improving slowly  -c/w warm compress  -call if not improving  -start 5 day trial of lasix to help with swelling in both feet  -encouraged to review edema with cardiology given she is on calcium channel blocker        Orders This Visit:  No orders of the defined types were placed in this encounter.      Meds This Visit:  Requested Prescriptions     Signed Prescriptions Disp Refills    furosemide 20 MG Oral Tab 5 tablet 1     Sig: Take 1 tablet (20 mg total) by mouth daily.       Imaging & Referrals:  None       PHYSICAL EXAM:     Patient Reported Vitals                             Gen: NAD, alert and oriented x 3, able to speak in full sentences  Pulm: No labored breathing or appreciable shortness of breath, no coughing during duration of visit  Psych: normal affect      HISTORY:       Past Medical History:    FHx: supraventricular tachycardia    Hypertension      Past Surgical History:   Procedure Laterality Date    Cataract      Knee replacement surgery Bilateral     Laparoscopic cholecystectomy      Other      varicositis re moved from right leg       History reviewed. No pertinent family history.   Social History:   Social History     Socioeconomic History    Marital status:    Tobacco Use    Smoking status: Never     Smokeless tobacco: Never   Vaping Use    Vaping status: Never Used   Substance and Sexual Activity    Alcohol use: Not Currently    Drug use: Never   Other Topics Concern    Caffeine Concern Yes     Comment: 1 cup of coffee daily    Stress Concern No    Weight Concern No    Special Diet No    Exercise No     Comment: daily walking    Seat Belt Yes     Social Drivers of Health      Received from [facility]    Housing Stability          Medications (Active prior to today's visit):  Current Outpatient Medications   Medication Sig Dispense Refill    furosemide 20 MG Oral Tab Take 1 tablet (20 mg total) by mouth daily. 5 tablet 1    sulfamethoxazole-trimethoprim -160 MG Oral Tab per tablet Take 1 tablet by mouth 2 (two) times daily.      prednisoLONE 1 % Ophthalmic Suspension       verapamil 120 MG Oral Tab CR Take 1 tablet (120 mg total) by mouth 2 (two) times daily. 180 tablet 3    B Complex-C-Folic Acid (HM VITAMIN B COMPLEX/VITAMIN C) Oral Tab Take 1 tablet by mouth daily.      Naproxen Sodium 220 MG Oral Cap Take 220 mg by mouth as needed.      apixaban 2.5 MG Oral Tab Take 1 tablet (2.5 mg total) by mouth 2 (two) times daily. (Patient not taking: Reported on 5/28/2024)      atropine 1 % Ophthalmic Solution Place 1 drop into the left eye 2 (two) times daily. (Patient not taking: Reported on 4/7/2025)      difluprednate 0.05 % Ophthalmic Emulsion Place 1 drop into the left eye 4 (four) times daily. (Patient not taking: Reported on 4/7/2025)         Allergies:  Allergies[1]      ROS:   --See HPI for relevant ROS    --GEN: No other complaints  --HEENT: No other complaints  --RESP: No other complaints  --CV: No other complaints  --GI: No other complaints  --: No other complaints  --MSK: No other complaints  All other systems reviewed are negative      AYLIN CELESTE MD     Telehealth outside of [facility]  Telehealth Verbal Consent   I conducted a telehealth visit with Mana Oviedo  Dillan today, 04/07/25, which was completed using two-way, real-time interactive audio and video communication. This has been done in good breann to provide continuity of care in the best interest of the provider-patient relationship, due to the COVID -19 public health crisis/national emergency where restrictions of face-to-face office visits are ongoing. Every conscious effort was taken to allow for sufficient and adequate time to complete the visit.  The patient was made aware of the limitations of the telehealth visit, including treatment limitations as no physical exam could be performed.  The patient was advised to call 911 or to go to the ER in case there was an emergency.  The patient was also advised of the potential privacy & security concerns related to the telehealth platform.   The patient was made aware of where to find [facility]'s notice of privacy practices, telehealth consent form and other related consent forms and documents.  which are located on the [facility] website. The patient verbally agreed to telehealth consent form, related consents and the risks discussed.    Lastly, the patient confirmed that they were in [place].   Included in this visit, time may have been spent reviewing labs, medications, radiology tests and decision making. Appropriate medical decision-making and tests are ordered as detailed in the plan of care above.  Coding/billing information is submitted for this visit based on complexity of care and/or time spent for the visit.      This visit is conducted using Telemedicine with live, interactive video and audio.    Patient has been referred to the [facility] website at [email or web address] to review the yearly Consent to Treat document.    Patient understands and accepts financial responsibility for any deductible, co-insurance and/or co-pays associated with this service.        Duration of the service: 30 min           [1]   Allergies  Allergen Reactions    Diltiazem UNKNOWN

## 2025-04-09 ENCOUNTER — MED REC SCAN ONLY (OUTPATIENT)
Dept: FAMILY MEDICINE CLINIC | Facility: CLINIC | Age: 89
End: 2025-04-09

## 2025-05-20 ENCOUNTER — HOSPITAL ENCOUNTER (OUTPATIENT)
Dept: ULTRASOUND IMAGING | Facility: HOSPITAL | Age: 89
Discharge: HOME | End: 2025-05-20
Attending: FAMILY MEDICINE
Payer: MEDICAID

## 2025-05-20 ENCOUNTER — OFFICE VISIT (OUTPATIENT)
Dept: FAMILY MEDICINE CLINIC | Facility: CLINIC | Age: 89
End: 2025-05-20
Payer: MEDICAID

## 2025-05-20 VITALS
TEMPERATURE: 99 F | BODY MASS INDEX: 25.52 KG/M2 | HEART RATE: 78 BPM | WEIGHT: 130 LBS | RESPIRATION RATE: 16 BRPM | OXYGEN SATURATION: 98 % | HEIGHT: 60 IN | DIASTOLIC BLOOD PRESSURE: 68 MMHG | SYSTOLIC BLOOD PRESSURE: 118 MMHG

## 2025-05-20 DIAGNOSIS — D64.9 NORMOCYTIC ANEMIA: ICD-10-CM

## 2025-05-20 DIAGNOSIS — M79.89 RIGHT LEG SWELLING: ICD-10-CM

## 2025-05-20 DIAGNOSIS — E78.2 MIXED HYPERLIPIDEMIA: ICD-10-CM

## 2025-05-20 DIAGNOSIS — Z00.00 ROUTINE GENERAL MEDICAL EXAMINATION AT A HEALTH CARE FACILITY: Primary | ICD-10-CM

## 2025-05-20 DIAGNOSIS — E53.8 B12 DEFICIENCY: ICD-10-CM

## 2025-05-20 DIAGNOSIS — R10.30 LOWER ABDOMINAL PAIN: ICD-10-CM

## 2025-05-20 PROCEDURE — 93971 EXTREMITY STUDY: CPT | Performed by: FAMILY MEDICINE

## 2025-05-20 PROCEDURE — 99397 PER PM REEVAL EST PAT 65+ YR: CPT | Performed by: FAMILY MEDICINE

## 2025-05-20 PROCEDURE — 99215 OFFICE O/P EST HI 40 MIN: CPT | Performed by: FAMILY MEDICINE

## 2025-05-20 RX ORDER — CEPHALEXIN 500 MG/1
500 CAPSULE ORAL 3 TIMES DAILY
Qty: 30 CAPSULE | Refills: 0 | Status: SHIPPED | OUTPATIENT
Start: 2025-05-20

## 2025-05-20 NOTE — PROGRESS NOTES
Mana Grimaldo is a 89 year old female who is here for   Chief Complaint   Patient presents with    Abdominal Pain     STARTED LIKE THE SIZE OF QUARTER NOW LIKE SIZE OF TENNIS     Leg Pain     EDEMA, HOT TO TOUCH, AND ITCHY        HPI:       1. Routine general medical examination at a health care facility  -due for wellness    2. Supraventricular tachycardia (HCC)  -tolerating meds  -sees cardiology (Dr. Traore)  -no palpitations    3. Age-related osteoporosis without current pathological fracture  -due for dexa - did not schedule last year  -off alendronate    2. Right leg swelling  -complains of increased swelling in right lower leg  -was seen in urgent care 1 month ago and diagnosed with superficial thrombophlebitis  -pain and redness worse    3. Lower abdominal pain  -also complaining of lower abd pain  -not severe  -denies urinary symptoms or constipations        Screening:  Diet: eats well  Exercise: walks  Sleep: normal  Depression/Anxiety: none        Pertinent Fam Hx:    History reviewed. No pertinent family history.    Social History     Socioeconomic History    Marital status:    Tobacco Use    Smoking status: Never    Smokeless tobacco: Never   Vaping Use    Vaping status: Never Used   Substance and Sexual Activity    Alcohol use: Not Currently    Drug use: Never   Other Topics Concern    Caffeine Concern Yes     Comment: 1 cup of coffee daily    Stress Concern No    Weight Concern No    Special Diet No    Exercise No     Comment: daily walking    Seat Belt Yes       Wt Readings from Last 6 Encounters:   05/20/25 130 lb (59 kg)   11/12/24 121 lb 6.4 oz (55.1 kg)   08/13/24 120 lb 9.6 oz (54.7 kg)   05/28/24 123 lb 6.4 oz (56 kg)   04/15/24 131 lb 12.8 oz (59.8 kg)   09/20/21 154 lb (69.9 kg)       Patient Active Problem List   Diagnosis    Dizziness and giddiness    Supraventricular tachycardia (HCC)    Tachycardia, unspecified    Osteoarthritis of knee, unilateral    Age-related  osteoporosis without current pathological fracture       Current Outpatient Medications on File Prior to Visit   Medication Sig Dispense Refill    sulfamethoxazole-trimethoprim -160 MG Oral Tab per tablet Take 1 tablet by mouth 2 (two) times daily.      prednisoLONE 1 % Ophthalmic Suspension       verapamil 120 MG Oral Tab CR Take 1 tablet (120 mg total) by mouth 2 (two) times daily. 180 tablet 3    furosemide 20 MG Oral Tab Take 1 tablet (20 mg total) by mouth daily. (Patient not taking: Reported on 5/20/2025) 5 tablet 1    apixaban 2.5 MG Oral Tab Take 1 tablet (2.5 mg total) by mouth 2 (two) times daily. (Patient not taking: Reported on 5/20/2025)      atropine 1 % Ophthalmic Solution Place 1 drop into the left eye 2 (two) times daily. (Patient not taking: Reported on 5/20/2025)      difluprednate 0.05 % Ophthalmic Emulsion Place 1 drop into the left eye 4 (four) times daily. (Patient not taking: Reported on 5/20/2025)      B Complex-C-Folic Acid ( VITAMIN B COMPLEX/VITAMIN C) Oral Tab Take 1 tablet by mouth daily. (Patient not taking: Reported on 5/20/2025)      Naproxen Sodium 220 MG Oral Cap Take 220 mg by mouth as needed. (Patient not taking: Reported on 5/20/2025)       Current Facility-Administered Medications on File Prior to Visit   Medication Dose Route Frequency Provider Last Rate Last Admin    cyanocobalamin (Vitamin B12) 1000 MCG/ML injection 1,000 mcg  1,000 mcg Intramuscular Q14 Days    1,000 mcg at 08/13/24 1147         REVIEW OF SYSTEMS:     GENERAL HEALTH: feels well otherwise. No f/c  NEURO: denies any headaches, LH, dizzyness, LOC, falls  VISION: denies any blurred or double vision  RESPIRATORY: denies shortness of breath, cough, or congestion  CARDIOVASCULAR: denies chest pain, pressure or palpitations  GI: denies abdominal pain, constipation, diarrhea, n/v, BRBPR, melena  : no dysuria, frequency, or hematuria  SKIN: denies any unusual skin lesions or rashes  PSYCH: mood is  stable  EXT: denies edema     Wt Readings from Last 6 Encounters:   05/20/25 130 lb (59 kg)   11/12/24 121 lb 6.4 oz (55.1 kg)   08/13/24 120 lb 9.6 oz (54.7 kg)   05/28/24 123 lb 6.4 oz (56 kg)   04/15/24 131 lb 12.8 oz (59.8 kg)   09/20/21 154 lb (69.9 kg)       Allergies   Allergen Reactions    Diltiazem UNKNOWN       History reviewed. No pertinent family history.   Past Medical History:    FHx: supraventricular tachycardia    Hypertension      Past Surgical History:   Procedure Laterality Date    Cataract      Knee replacement surgery Bilateral     Laparoscopic cholecystectomy      Other      varicositis re moved from right leg       Social History:    Social History     Socioeconomic History    Marital status:    Tobacco Use    Smoking status: Never    Smokeless tobacco: Never   Vaping Use    Vaping status: Never Used   Substance and Sexual Activity    Alcohol use: Not Currently    Drug use: Never   Other Topics Concern    Caffeine Concern Yes     Comment: 1 cup of coffee daily    Stress Concern No    Weight Concern No    Special Diet No    Exercise No     Comment: daily walking    Seat Belt Yes     Social Drivers of Health      Received from [facility]    Housing Stability           EXAM:   /68 (BP Location: Right arm, Patient Position: Sitting, Cuff Size: adult)   Pulse 78   Temp 98.9 °F (37.2 °C) (Temporal)   Resp 16   Ht 5' (1.524 m)   Wt 130 lb (59 kg)   SpO2 98%   BMI 25.39 kg/m²     GENERAL: A&O, in no apparent distress  HEENT: atraumatic, MMM, throat is clear  EYES: PERRLA, EOMI  NECK: supple, no thyromegaly  CHEST: no tenderness  LUNGS: clear to auscultation bilateraly, no c/w/r  CARDIO: RRR without murmurs  GI: soft, non-tender, non-distended, no appreciable hsm, bs throughout  NEURO: CN II-XII grossly intact  PSYCH: pleasant  MUSCULOSKELETAL: normal gait, no appreciable defects  EXTREMITIES: no cyanosis, clubbing or edema  SKIN: no rashes,no suspicious  lesions    Problem focused exam (for problems outside of physical, if any):  Incisions healing well    The ASCVD Risk score (John DK, et al., 2019) failed to calculate for the following reasons:    The 2019 ASCVD risk score is only valid for ages 40 to 79    ASSESSMENT AND PLAN:     Health Maintenance  -We discussed the following:  Healthy diet and exercise, cancer screening, self breast exams and calcium and vitamin D supplementation.    -Fasting labs ordered  -recheck DEXA    Stress Management: counseled  Reviewed age appropriate screening    1. Routine general medical examination at a health care facility  - CBC WITH DIFFERENTIAL WITH PLATELET  - COMP METABOLIC PANEL (14)    2. Supraventricular tachycardia (HCC)  -stable, though was started on eliquis in [place] for her \"heart\"  -no notes in records regarding afib  -advised to followup with cardiology to further discuss this  -may need holter    3. Age-related osteoporosis without current pathological fracture  -off alendronate  -no new issues  -will need repeat DEXA ordered at next visit    2. Right leg swelling  -check US to r/o DVT  -if unremarkable, will consider treating for cellulitis    - US VENOUS DOPPLER LEG RIGHT - DIAG IMG (CPT=93971); Future    3. Lower abdominal pain  - US ABDOMEN LIMITED (CPT=76705); Future    4. B12 deficiency  - Vitamin B12    5. Mixed hyperlipidemia  - CBC With Differential With Platelet  - Comp Metabolic Panel (14)  - Lipid Panel  - TSH W Reflex To Free T4    6. Normocytic anemia  - CBC With Differential With Platelet  - Comp Metabolic Panel (14)  - Lipid Panel  - TSH W Reflex To Free T4  - Iron And Tibc [E]  - Ferritin [E]          Orders This Visit:  Orders Placed This Encounter   Procedures    CBC With Differential With Platelet    Comp Metabolic Panel (14)    Lipid Panel    TSH W Reflex To Free T4    Vitamin B12    Iron And Tibc [E]    Ferritin [E]       Meds This Visit:  Requested Prescriptions     Signed Prescriptions  Disp Refills    cephALEXin 500 MG Oral Cap 30 capsule 0     Sig: Take 1 capsule (500 mg total) by mouth 3 (three) times daily.       Imaging & Referrals:  US ABDOMEN LIMITED (CPT=76705)     The patient indicates understanding of these issues and agrees to the plan.  The patient is asked to return in 6 months, sooner prn.  AYLIN CELESTE MD    I spent a total of 40 minutes, more than half of which was spent counseling/coordinating care regarding SVT, and sbo (outside of time for wellness)

## 2025-05-20 NOTE — PATIENT INSTRUCTIONS
Llama para hacer ultrasonido de londono pierna hoy    Llama para hacer bertha de ultrasonido de estomago (no necessita muy rapido)    Federica si dolor de estomago esta peor, puede ir a immediate care de [place]    Va a [facility] para lazara en ayunas    Regresa en 6 semanas, antes si necessita

## 2025-05-29 ENCOUNTER — TELEPHONE (OUTPATIENT)
Dept: FAMILY MEDICINE CLINIC | Facility: CLINIC | Age: 89
End: 2025-05-29

## 2025-05-29 RX ORDER — TRIAMCINOLONE ACETONIDE 1 MG/G
1 CREAM TOPICAL 2 TIMES DAILY
Qty: 60 G | Refills: 0 | Status: SHIPPED | OUTPATIENT
Start: 2025-05-29

## 2025-05-29 NOTE — TELEPHONE ENCOUNTER
Calling to inform Dr. Elder Jaimes that Antibiotics prescribed in LOV are not working, leg has not gotten better, swelling, redness, no improvement.     Please advise

## 2025-05-29 NOTE — TELEPHONE ENCOUNTER
Called and spoke to patients daughter. States patients leg has not gotten better, has also not gotten worse. She states the patient is itching the leg a little more than she was. Right leg symptoms include, swelling, redness, and pain. Patients daughter stated they went to the cardiologist yesterday and were told she may need a higher dose of antibiotics.     Please advise   [As Noted in HPI] : as noted in HPI [Negative] : Heme/Lymph

## 2025-05-29 NOTE — TELEPHONE ENCOUNTER
Spoke with Dr Jaimes regarding patient's symptoms. Verbal order received for triamcinolone 0.1%  cream BID, have patient take otc Claritin or Zyrtec at night for itching, call if not any better on Monday to have patient come in for office visit and If her symptoms worsen advise patient to immediate care. Order placed for cream will call patient and daughter to inform.

## 2025-05-29 NOTE — TELEPHONE ENCOUNTER
She's been on a lot of antibiotics the past few months.  If not worse, would continue this and touch base next week if still not improving

## 2025-05-29 NOTE — TELEPHONE ENCOUNTER
Called and spoke with patient's daughter. Discussed orders from Dr Jaimes. Voiced understanding and agreeable.

## 2025-05-29 NOTE — TELEPHONE ENCOUNTER
Called and spoke with daughter to discuss recommendations from Dr Jaimes. Patient's leg is not worse but not any better and patient has been scratching her leg a lot. This has now spread to her other leg. She only has 1 day left of her antibiotic. Would like to know what else to do.

## 2025-06-04 ENCOUNTER — TELEPHONE (OUTPATIENT)
Dept: FAMILY MEDICINE CLINIC | Facility: CLINIC | Age: 89
End: 2025-06-04

## 2025-06-04 DIAGNOSIS — R21 RASH: Primary | ICD-10-CM

## 2025-06-04 NOTE — TELEPHONE ENCOUNTER
Continue cream for now  Referral placed to dermatology  If symptoms worsening, will need to go to [facility] in [place]

## 2025-06-04 NOTE — TELEPHONE ENCOUNTER
Called and spoke to patients daughter. Stated the patients leg has not improved with the cream. The swelling, color, and itchiness is the same. She reports it could be slightly better, but nothing really noticeable.     She would like to be advised if there is anything else they could do or next steps

## 2025-06-04 NOTE — TELEPHONE ENCOUNTER
Granddaughter calling to inform Dr. Elder Jaimes that the topical cream prescribed for patients leg is not working. Is there anything else they can do or next steps     Please advise

## 2025-06-06 ENCOUNTER — APPOINTMENT (OUTPATIENT)
Dept: ULTRASOUND IMAGING | Age: 89
End: 2025-06-06
Attending: PHYSICIAN ASSISTANT
Payer: MEDICAID

## 2025-06-06 ENCOUNTER — HOSPITAL ENCOUNTER (EMERGENCY)
Age: 89
Discharge: HOME | End: 2025-06-06
Attending: EMERGENCY MEDICINE
Payer: MEDICAID

## 2025-06-06 ENCOUNTER — TELEPHONE (OUTPATIENT)
Dept: FAMILY MEDICINE CLINIC | Facility: CLINIC | Age: 89
End: 2025-06-06

## 2025-06-06 VITALS
OXYGEN SATURATION: 99 % | BODY MASS INDEX: 25.52 KG/M2 | RESPIRATION RATE: 16 BRPM | DIASTOLIC BLOOD PRESSURE: 76 MMHG | SYSTOLIC BLOOD PRESSURE: 149 MMHG | HEART RATE: 77 BPM | HEIGHT: 60 IN | TEMPERATURE: 98 F | WEIGHT: 130 LBS

## 2025-06-06 DIAGNOSIS — I82.811 GREATER SAPHENOUS VEIN EMBOLISM, RIGHT: Primary | ICD-10-CM

## 2025-06-06 LAB
ALBUMIN SERPL-MCNC: 4.1 G/DL (ref 3.2–4.8)
ALBUMIN/GLOB SERPL: 1.5 {RATIO} (ref 1–2)
ALP LIVER SERPL-CCNC: 110 U/L (ref 55–142)
ALT SERPL-CCNC: <7 U/L (ref 10–49)
ANION GAP SERPL CALC-SCNC: 7 MMOL/L (ref 0–18)
AST SERPL-CCNC: 18 U/L (ref ?–34)
BASOPHILS # BLD AUTO: 0.03 X10(3) UL (ref 0–0.2)
BASOPHILS NFR BLD AUTO: 0.6 %
BILIRUB SERPL-MCNC: 0.3 MG/DL (ref 0.2–1.1)
BUN BLD-MCNC: 20 MG/DL (ref 9–23)
CALCIUM BLD-MCNC: 9.1 MG/DL (ref 8.7–10.6)
CHLORIDE SERPL-SCNC: 107 MMOL/L (ref 98–112)
CO2 SERPL-SCNC: 26 MMOL/L (ref 21–32)
CREAT BLD-MCNC: 0.59 MG/DL (ref 0.55–1.02)
EGFRCR SERPLBLD CKD-EPI 2021: 87 ML/MIN/1.73M2 (ref 60–?)
EOSINOPHIL # BLD AUTO: 0.17 X10(3) UL (ref 0–0.7)
EOSINOPHIL NFR BLD AUTO: 3.2 %
ERYTHROCYTE [DISTWIDTH] IN BLOOD BY AUTOMATED COUNT: 16.9 %
GLOBULIN PLAS-MCNC: 2.7 G/DL (ref 2–3.5)
GLUCOSE BLD-MCNC: 86 MG/DL (ref 70–99)
HCT VFR BLD AUTO: 35 % (ref 35–48)
HGB BLD-MCNC: 11 G/DL (ref 12–16)
IMM GRANULOCYTES # BLD AUTO: 0.01 X10(3) UL (ref 0–1)
IMM GRANULOCYTES NFR BLD: 0.2 %
LYMPHOCYTES # BLD AUTO: 1.13 X10(3) UL (ref 1–4)
LYMPHOCYTES NFR BLD AUTO: 21.5 %
MCH RBC QN AUTO: 24.9 PG (ref 26–34)
MCHC RBC AUTO-ENTMCNC: 31.4 G/DL (ref 31–37)
MCV RBC AUTO: 79.2 FL (ref 80–100)
MONOCYTES # BLD AUTO: 0.31 X10(3) UL (ref 0.1–1)
MONOCYTES NFR BLD AUTO: 5.9 %
NEUTROPHILS # BLD AUTO: 3.6 X10 (3) UL (ref 1.5–7.7)
NEUTROPHILS # BLD AUTO: 3.6 X10(3) UL (ref 1.5–7.7)
NEUTROPHILS NFR BLD AUTO: 68.6 %
OSMOLALITY SERPL CALC.SUM OF ELEC: 292 MOSM/KG (ref 275–295)
PLATELET # BLD AUTO: 152 10(3)UL (ref 150–450)
POTASSIUM SERPL-SCNC: 4.4 MMOL/L (ref 3.5–5.1)
PROT SERPL-MCNC: 6.8 G/DL (ref 5.7–8.2)
RBC # BLD AUTO: 4.42 X10(6)UL (ref 3.8–5.3)
SODIUM SERPL-SCNC: 140 MMOL/L (ref 136–145)
WBC # BLD AUTO: 5.3 X10(3) UL (ref 4–11)

## 2025-06-06 PROCEDURE — 36415 COLL VENOUS BLD VENIPUNCTURE: CPT

## 2025-06-06 PROCEDURE — 85025 COMPLETE CBC W/AUTO DIFF WBC: CPT | Performed by: PHYSICIAN ASSISTANT

## 2025-06-06 PROCEDURE — 99284 EMERGENCY DEPT VISIT MOD MDM: CPT

## 2025-06-06 PROCEDURE — 80053 COMPREHEN METABOLIC PANEL: CPT | Performed by: PHYSICIAN ASSISTANT

## 2025-06-06 PROCEDURE — 93971 EXTREMITY STUDY: CPT | Performed by: PHYSICIAN ASSISTANT

## 2025-06-06 PROCEDURE — 99285 EMERGENCY DEPT VISIT HI MDM: CPT

## 2025-06-06 RX ORDER — ASPIRIN 325 MG
325 TABLET ORAL DAILY
Qty: 30 TABLET | Refills: 0 | Status: SHIPPED | OUTPATIENT
Start: 2025-06-06

## 2025-06-06 NOTE — ED INITIAL ASSESSMENT (HPI)
Pt sent in from MD's office d/t cellulitis right foot.  Ongoing problem since February but progressively getting worse. Finished oral antibiotics about 1 week ago.

## 2025-06-06 NOTE — TELEPHONE ENCOUNTER
Patient and patients daughter walked in to see if Dr. Elder Jaimes wanted her to go to Dermatology office or ER to be evaluated for leg rash (cellulitis).  Daughter stated that the cardiologist recommended that she should have IV antibiotics for cellutitis. Dr. Elder Jaimes is out of office today and nursing staff advised that patient be evaluated at [facility].      Patient and daughter agreed.

## 2025-06-06 NOTE — ED PROVIDER NOTES
Patient Seen in: [facility] Emergency Department In [place]        History  Chief Complaint   Patient presents with    Cellulitis     Stated Complaint: right foot infection    Subjective:   HPI            89-year-old female presents to the ER with granddaughter for evaluation of right leg swelling.  Per daughter, patient has had right leg swelling since February, progressively worsening.  Was told she had a superficial clot before, but it is now resolved.  Not currently on any anticoagulation, including aspirin.    Granddaughter states they saw cardiologist on 5/28 and he was concerned about possible cellulitis.  Denies any fever, wounds or purulent discharge.      Objective:     Past Medical History:    FHx: supraventricular tachycardia    Hypertension              Past Surgical History:   Procedure Laterality Date    Cataract      Knee replacement surgery Bilateral     Laparoscopic cholecystectomy      Other      varicositis re moved from right leg                 Social History     Socioeconomic History    Marital status:    Tobacco Use    Smoking status: Never    Smokeless tobacco: Never   Vaping Use    Vaping status: Never Used   Substance and Sexual Activity    Alcohol use: Not Currently    Drug use: Never   Other Topics Concern    Caffeine Concern Yes     Comment: 1 cup of coffee daily    Stress Concern No    Weight Concern No    Special Diet No    Exercise No     Comment: daily walking    Seat Belt Yes     Social Drivers of Health      Received from [facility]    Housing Stability                                Physical Exam    ED Triage Vitals [06/06/25 1502]   /69   Pulse 72   Resp 18   Temp 97.7 °F (36.5 °C)   Temp src Temporal   SpO2 97 %   O2 Device None (Room air)       Current Vitals:   Vital Signs  BP: 149/76  Pulse: 77  Resp: 16  Temp: 97.7 °F (36.5 °C)  Temp src: Temporal    Oxygen Therapy  SpO2: 99 %  O2 Device: None (Room air)            Physical Exam  Vitals and  nursing note reviewed.   Constitutional:       Appearance: She is well-developed.   HENT:      Head: Atraumatic.      Right Ear: External ear normal.      Left Ear: External ear normal.   Eyes:      Conjunctiva/sclera: Conjunctivae normal.   Cardiovascular:      Rate and Rhythm: Normal rate.   Pulmonary:      Effort: Pulmonary effort is normal.   Musculoskeletal:      Cervical back: Normal range of motion and neck supple.      Right lower leg: Tenderness (proximal calf) present. No bony tenderness. Edema present.      Left lower leg: Normal. No bony tenderness.   Skin:     General: Skin is warm and dry.      Capillary Refill: Capillary refill takes less than 2 seconds.      Comments: Right shin with minimal pink-colored spots   Neurological:      Mental Status: She is alert.      Gait: Gait normal.   Psychiatric:         Mood and Affect: Mood normal.                 ED Course  Labs Reviewed   COMP METABOLIC PANEL (14) - Abnormal; Notable for the following components:       Result Value    ALT <7 (*)     All other components within normal limits   CBC WITH DIFFERENTIAL WITH PLATELET - Abnormal; Notable for the following components:    HGB 11.0 (*)     MCV 79.2 (*)     MCH 24.9 (*)     All other components within normal limits                            MDM     89-year-old female presents to the ER with right leg swelling since feb.    Differential diagnosis reflecting the complexity of care include: DVT, superficial thrombophlebitis, cellulitis    Comorbidities that add complexity to management include: h/o eliquis use    External chart review was done and was noted:  5/20/25 venous doppler negative    History obtained by an independent source was from: granddaughter      Repeat ultrasound today is consistent with the greater saphenous vein clot.  Clot is in the proximal calf, below the knee.  Since she had a \"superficial clot\" in April, but it cleared up in May and now it reappeared again, hematology was consulted  to see if anticoagulation is necessary. Per hematologist, okay to discharge with aspirin and warm compress, close follow up outpatient.  Explained to patient and granddaughter on all results.  The \"redness\" that they are noticing is likely related to the superficial thrombophlebitis.  Recommended daily aspirin and follow-up closely with hematology.  All questions answered.      Medical Decision Making      Disposition and Plan     Clinical Impression:  1. Greater saphenous vein embolism, right         Disposition:  Discharge  6/6/2025  6:37 pm    Follow-up:  Shonda Wetzel MD  [facility]  [address] [facility]  [facility] [address]  [phone]    Follow up            Medications Prescribed:  Discharge Medication List as of 6/6/2025  6:40 PM        START taking these medications    Details   aspirin 325 MG Oral Tab Take 1 tablet (325 mg total) by mouth daily., Normal, Disp-30 tablet, R-0                   Supplementary Documentation:

## 2025-06-09 ENCOUNTER — TELEPHONE (OUTPATIENT)
Age: 89
End: 2025-06-09

## 2025-06-09 NOTE — TELEPHONE ENCOUNTER
----- Message from Shonda Wetzel sent at 6/6/2025  8:10 PM CDT -----  Please contact grand daughter to offer new pt appt fri 6/13  at 8: 30 am , pt was in ER with leg clot. Antwan Wetzel

## 2025-06-13 NOTE — CONSULTS
Cancer Center Report of Consultation    Patient Name: Mana Grimaldo   YOB: 1935   Medical Record Number: [id number]   CSN: [id number]   Consulting Physician: Shonda Wetzel MD  Referring Physician(s): AYLIN CELESTE MD    Date of Consultation: 6/13/2025     Reason for Consultation:  Mana Grimaldo was seen today in the Cancer Center for the diagnosis of right leg superficial thrombosis.    History of Present Illness:     89 year old that was seen by PCP in 5/25 with complaints of right leg swelling.  US 5/25-no DVT.  Then presented to ER 6/25 for worsening swelling.  Repeat ultrasound in the ER 6/25 showed superficial thrombosis in right greater saphenous vein proximal calf.  Aspirin was started.  Prior to this, had an ultrasound outside facility 4/25 that also showed superficial thrombophlebitis greater saphenous vein right proximal calf.  Presents for evaluation.    On  mg daily. Pt says R leg pain, swelling, itching since 2/25. Pain better.  Pt thinks she had R leg clot in [place] 3-4 years back. Took medication for it, just 1 injection so likely not anticoagulation.     Pt says something is growing in her stomach. Had hernia surgery 1 yr back in [place]. No pain or rectal bleeding.     Past Medical History:  Past Medical History[1]    Past Surgical History:  Past Surgical History[2]    Family Medical History:  Family History[3]    Psychosocial History:  Social History     Socioeconomic History    Marital status:      Spouse name: Not on file    Number of children: Not on file    Years of education: Not on file    Highest education level: Not on file   Occupational History    Not on file   Tobacco Use    Smoking status: Never    Smokeless tobacco: Never   Vaping Use    Vaping status: Never Used   Substance and Sexual Activity    Alcohol use: Not Currently    Drug use: Never    Sexual activity: Not on file   Other Topics Concern     Service Not Asked     Blood Transfusions Not Asked    Caffeine Concern Yes     Comment: 1 cup of coffee daily    Occupational Exposure Not Asked    Hobby Hazards Not Asked    Sleep Concern Not Asked    Stress Concern No    Weight Concern No    Special Diet No    Back Care Not Asked    Exercise No     Comment: daily walking    Bike Helmet Not Asked    Seat Belt Yes    Self-Exams Not Asked   Social History Narrative    Not on file     Social Drivers of Health     Food Insecurity: Not on file   Transportation Needs: Not on file   Housing Stability: Low Risk  (5/9/2024)    Received from [facility]    Housing Stability     Mortgage Payment Concerns?: Not on file     Number of Places Lived in the Last Year: Not on file     Unstable Housing?: Not on file       Allergies:   Allergies[4]    Current Medications:  Medications - Current[5]    Review of Systems:    Constitutional: No chills, fevers, malaise, night sweats and weight loss.   Eyes: No visual disturbance, irritation and redness.  Ears, nose, mouth, throat, and face: No hearing loss, tinnitus, hoarseness and voice change.  Respiratory: No cough, sputum, hemoptysis, chest pain, wheezing, dyspnea on exertion  Cardiovascular: No chest pain, palpitations, syncope,orthopnea, PND, edema  Gastrointestinal: abdominal pain.  Derm: No rash, skin lesions, and pruritus.  Hematologic/lymphatic: No easy bruising, bleeding, and lymphadenopathy.  Musculoskeletal: R calf pain  Neurological: No headaches, dizziness, seizures, speech problems, gait problems   Psych: No anxiety/depression.    Vital Signs:  /71 (BP Location: Left arm, Patient Position: Sitting, Cuff Size: adult)   Pulse 74   Temp 98 °F (36.7 °C) (Temporal)   Resp 16   Ht 1.524 m (5')   Wt 60.3 kg (133 lb)   SpO2 95%   BMI 25.97 kg/m²     ECOG PS: 1    Physical Examination:    General: Patient is alert and oriented x 3, not in acute distress. Accompanied by daughter in law  Neck: No lymphadenopathy.    Lymphatics: There is no palpable lymphadenopathy  in the cervical, axillary, or inguinal regions.  Chest: Clear to auscultation.  Heart: Regular rate and rhythm. S1S2 normal.  Abdomen: Soft, form mass above umbilicus, ill defined  ~ 10 x 10 cm, non tender.   Extremities: Mild edema/firmness and induration R calf, no erythema or warmth.   Neurological: Grossly intact.      Labs:         Assessment and Plan:    # Right lower leg superficial thrombosis: Ultrasound 4/25 and 6/25 showed greater saphenous vein thrombosis in proximal calf.  She had another ultrasound done here 5/25 that did not show any thrombosis.  ASA started in ER few days back. She feels better. Continue. Plan repeau US 1 month once other issues resolve.     # Abdominal mass: pt admitted on questioning as underlying malig can increase risk of VTE. Palpable mass around 10 cm. Last colonoscopy 17 yrs back. MCHC anemia, concerning for occult GIB. Scott CT C/A/P. US ordered by PCP and scheduled for 6/19 but I feel CT will give more info so told them US can be cancelled.     Mammogram long time back.     # Microcytic anemia: concerning for GIB. Will check fe levels after CT done.      line used.     Orders Placed:        Procedures    CT CHEST+ABDOMEN+PELVIS(ALL CNTRST ONLY)(TDQ=50723/23623)       Jacqui Wetzel M.D.    [facility]    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 No pertinent family history.  [4]   Allergies  Allergen Reactions    Diltiazem UNKNOWN   [5]   Current Outpatient  Medications:     aspirin 325 MG Oral Tab, Take 1 tablet (325 mg total) by mouth daily., Disp: 30 tablet, Rfl: 0    triamcinolone 0.1 % External Cream, Apply 1 Application topically 2 (two) times daily., Disp: 60 g, Rfl: 0    prednisoLONE 1 % Ophthalmic Suspension, , Disp: , Rfl:     verapamil 120 MG Oral Tab CR, Take 1 tablet (120 mg total) by mouth 2 (two) times daily., Disp: 180 tablet, Rfl: 3    cephALEXin 500 MG Oral Cap, Take 1 capsule (500 mg total) by mouth 3 (three) times daily. (Patient not taking: Reported on 6/16/2025), Disp: 30 capsule, Rfl: 0    furosemide 20 MG Oral Tab, Take 1 tablet (20 mg total) by mouth daily. (Patient not taking: No sig reported), Disp: 5 tablet, Rfl: 1    atropine 1 % Ophthalmic Solution, Place 1 drop into the left eye 2 (two) times daily. (Patient not taking: Reported on 4/7/2025), Disp: , Rfl:     difluprednate 0.05 % Ophthalmic Emulsion, Place 1 drop into the left eye 4 (four) times daily. (Patient not taking: Reported on 4/7/2025), Disp: , Rfl:     B Complex-C-Folic Acid (HM VITAMIN B COMPLEX/VITAMIN C) Oral Tab, Take 1 tablet by mouth daily. (Patient not taking: No sig reported), Disp: , Rfl:     Naproxen Sodium 220 MG Oral Cap, Take 220 mg by mouth as needed. (Patient not taking: No sig reported), Disp: , Rfl:

## 2025-06-16 ENCOUNTER — OFFICE VISIT (OUTPATIENT)
Age: 89
End: 2025-06-16
Attending: INTERNAL MEDICINE
Payer: MEDICAID

## 2025-06-16 VITALS
DIASTOLIC BLOOD PRESSURE: 71 MMHG | OXYGEN SATURATION: 95 % | TEMPERATURE: 98 F | HEART RATE: 74 BPM | BODY MASS INDEX: 26.11 KG/M2 | HEIGHT: 60 IN | WEIGHT: 133 LBS | SYSTOLIC BLOOD PRESSURE: 124 MMHG | RESPIRATION RATE: 16 BRPM

## 2025-06-16 DIAGNOSIS — I82.811 SUPERFICIAL THROMBOSIS OF LEG, RIGHT: ICD-10-CM

## 2025-06-16 DIAGNOSIS — R19.05 PERIUMBILICAL MASS: Primary | ICD-10-CM

## 2025-06-16 NOTE — PROGRESS NOTES
Education Record    Learner:  Patient/ family member    Disease / Diagnosis:    Right leg superficial vein thrombus no DVT     Barriers / Limitations:  None   Comments:    Method:  Discussion   Comments:    General Topics:  Plan of care reviewed   Comments:    Outcome:  Shows understanding   Comments:   Utilizing .   Pt reports itching, pain, and burning sensation, is sl better.   Taking asa 325mg daily.

## 2025-06-21 ENCOUNTER — HOSPITAL ENCOUNTER (OUTPATIENT)
Dept: CT IMAGING | Age: 89
Discharge: HOME | End: 2025-06-21
Attending: INTERNAL MEDICINE
Payer: MEDICAID

## 2025-06-21 DIAGNOSIS — R19.05 PERIUMBILICAL MASS: ICD-10-CM

## 2025-06-21 PROCEDURE — 74177 CT ABD & PELVIS W/CONTRAST: CPT | Performed by: INTERNAL MEDICINE

## 2025-06-24 ENCOUNTER — VIRTUAL PHONE E/M (OUTPATIENT)
Age: 89
End: 2025-06-24
Attending: INTERNAL MEDICINE
Payer: MEDICAID

## 2025-06-24 DIAGNOSIS — D50.9 MICROCYTIC ANEMIA: ICD-10-CM

## 2025-06-24 DIAGNOSIS — I80.01 SUPERFICIAL THROMBOPHLEBITIS OF RIGHT LEG: ICD-10-CM

## 2025-06-24 DIAGNOSIS — R22.2 ABDOMINAL WALL MASS: Primary | ICD-10-CM

## 2025-06-24 NOTE — PROGRESS NOTES
Virtual Telephone Check-In    Mana Grimaldo verbally consents to a Virtual/Telephone Check-In visit on 06/24/25.  Patient has been referred to the [facility] website at [email or web address] to review the yearly Consent to Treat document.    Patient understands and accepts financial responsibility for any deductible, co-insurance and/or co-pays associated with this service.    Duration of the service: 30 minutes      [facility] Progress Note      Patient Name:  Mana Grimaldo  YOB: 1935  Medical Record Number:  [id number]    Date of visit:  6/24/2025    CHIEF COMPLAINT: Right leg superficial thrombophlebitis.    HPI:     89 year old that I saw last week as a new consult for right leg superficial thrombophlebitis detected on ultrasound outside facility 4/25 and here in the ER 6/25.  She did have an ultrasound 5/25 that did not show any thrombosis.  She has been on aspirin.  Very poor historian.  Guyanese-speaking.  Possible history of blood clot 4 years back in [place].  Took 1 injection for it.    On exam, she was found to have an abdominal mass.  History of hernia surgery in [place] about a year back.  She says she has been feeling this mass for some time and it has been growing.  CT scan was ordered.  Telephone visit.    ROS:     Constitutional: no fever, chills fatigue,night sweats or weight loss  Neurologic: no headache, seizures, diplopia or weakness  Respiratory: no cough, SOB or hemoptysis  Cardiovascular: no chest pain, ankle swelling, BYERS  GI: no nausea, vomiting, diarrhea or BRBPR  Musculoskeletal: no body-ache or joint pain  Dermatologic: no alopecia, rash, pruritis  : no hematuria, dysuria or frequency  Psych: no confusion or depression   Heme: no easy bruising or bleeding     ALLERGIES:  Allergies[1]    MEDICATIONS:  Current Medications[2]    VITALS:      PS:  ECOG:     PHYSICAL EXAM:    LABS:     No results found for this or any previous visit  (from the past 24 hours).    ASSESSMENT AND PLAN:     # Abdominal mass: Per patient this started following hernia surgery in [place] a year back.  CT A/P showed masslike thickening of the rectus sheath with some fluid inside which could be an infection.  Malignancy was considered less likely.  Prescription for Augmentin sent to her pharmacy.  Recommend surgical evaluation.    # Right leg superficial thrombophlebitis: Detected on ultrasound outside facility 4/25 and here 6/25.  Currently on aspirin.  Repeat ultrasound in 7/25.  If no resolution or worsening thrombosis, could consider DOAC.    # Microcytic anemia: Possibly due to iron deficiency.  Needs GI workup.    Will contact her after ultrasound done.  Her granddaughter Jessy Ovalle acted as .    ORDERS PLACED:      Jacqui Wetzel M.D.    [facility]    [facility] [place]  [address] [place], [place], [address]    6/24/2025         [1]   Allergies  Allergen Reactions    Diltiazem UNKNOWN   [2]   Current Outpatient Medications   Medication Sig Dispense Refill    amoxicillin clavulanate 875-125 MG Oral Tab Take 1 tablet by mouth 2 (two) times daily. 20 tablet 0    aspirin 325 MG Oral Tab Take 1 tablet (325 mg total) by mouth daily. 30 tablet 0    triamcinolone 0.1 % External Cream Apply 1 Application topically 2 (two) times daily. 60 g 0    cephALEXin 500 MG Oral Cap Take 1 capsule (500 mg total) by mouth 3 (three) times daily. (Patient not taking: Reported on 6/16/2025) 30 capsule 0    furosemide 20 MG Oral Tab Take 1 tablet (20 mg total) by mouth daily. (Patient not taking: No sig reported) 5 tablet 1    prednisoLONE 1 % Ophthalmic Suspension       atropine 1 % Ophthalmic Solution Place 1 drop into the left eye 2 (two) times daily. (Patient not taking: Reported on 4/7/2025)      difluprednate 0.05 % Ophthalmic Emulsion Place 1 drop into the left eye 4 (four) times daily. (Patient not taking: Reported on  4/7/2025)      verapamil 120 MG Oral Tab CR Take 1 tablet (120 mg total) by mouth 2 (two) times daily. 180 tablet 3    B Complex-C-Folic Acid (HM VITAMIN B COMPLEX/VITAMIN C) Oral Tab Take 1 tablet by mouth daily. (Patient not taking: No sig reported)      Naproxen Sodium 220 MG Oral Cap Take 220 mg by mouth as needed. (Patient not taking: No sig reported)

## 2025-06-26 LAB
AMB EXT CALCIUM: 9.3
AMB EXT CARBON DIOXIDE: 28
AMB EXT CHLORIDE: 104
AMB EXT CHOLESTEROL, TOTAL: 167 MG/DL
AMB EXT GLUCOSE: 92 MG/DL
AMB EXT HDL CHOLESTEROL: 49 MG/DL
AMB EXT HEMATOCRIT: 37.6
AMB EXT HEMOGLOBIN: 11.2
AMB EXT LDL CHOLESTEROL, DIRECT: 95 MG/DL
AMB EXT PLATELETS: 135
AMB EXT POSTASSIUM: 4.4 MMOL/L
AMB EXT SODIUM: 140 MMOL/L
AMB EXT TOTAL PROTEIN: 6.8
AMB EXT TRIGLYCERIDES: 130 MG/DL
AMB EXT TSH: 7.64 MIU/ML
AMB EXT WBC: 4.9 X10(3)UL

## 2025-07-03 ENCOUNTER — OFFICE VISIT (OUTPATIENT)
Dept: FAMILY MEDICINE CLINIC | Facility: CLINIC | Age: 89
End: 2025-07-03
Payer: MEDICAID

## 2025-07-03 ENCOUNTER — MED REC SCAN ONLY (OUTPATIENT)
Dept: FAMILY MEDICINE CLINIC | Facility: CLINIC | Age: 89
End: 2025-07-03

## 2025-07-03 VITALS
BODY MASS INDEX: 26.11 KG/M2 | TEMPERATURE: 98 F | RESPIRATION RATE: 16 BRPM | OXYGEN SATURATION: 97 % | HEIGHT: 60 IN | WEIGHT: 133 LBS | SYSTOLIC BLOOD PRESSURE: 118 MMHG | DIASTOLIC BLOOD PRESSURE: 62 MMHG | HEART RATE: 75 BPM

## 2025-07-03 DIAGNOSIS — M79.89 RIGHT LEG SWELLING: ICD-10-CM

## 2025-07-03 DIAGNOSIS — I80.01 THROMBOPHLEBITIS OF SUPERFICIAL VEINS OF RIGHT LOWER EXTREMITY: Primary | ICD-10-CM

## 2025-07-03 DIAGNOSIS — R19.05 PERIUMBILICAL MASS: ICD-10-CM

## 2025-07-03 DIAGNOSIS — D50.8 OTHER IRON DEFICIENCY ANEMIA: ICD-10-CM

## 2025-07-03 PROCEDURE — 99215 OFFICE O/P EST HI 40 MIN: CPT | Performed by: FAMILY MEDICINE

## 2025-07-03 NOTE — PROGRESS NOTES
Mana Grimaldo is a 89 year old female here for   Chief Complaint   Patient presents with    Follow - Up     Here to review labs and CT scan     Mass     Abdominal mass pain when putting pressure        HPI:       1. Thrombophlebitis of superficial veins of right lower extremity  2. Right leg swelling  -recurrent  -on   -has upcoming repeat US    3. Other iron deficiency anemia  -recently labs completed at [facility]  -iron levels low with low-normal ferritin  -b12 normal  -denies melena or hematochezia    Fe, total - 25 L  %sat - 8 L  Hg - 11.2 L  Ferritin - 58  B12 - 446    4. Periumbilical mass  -inflamed rectus sheath per CT  -has appt scheduled with gen surg  -does note hx of hernia repair in [place] a few years ago  -area is only tender with palpation          HISTORY:  Past Medical History[1]   Past Surgical History[2]   Family History[3]   Social History: Short Social Hx on File[4]     Medications (Active prior to today's visit):  Current Medications[5]    Allergies:  Allergies[6]      ROS:   See HPI for relevant ROS    --GEN: No other complaints  --HEENT: No other complaints  --RESP: No other complaints  --CV: No other complaints  --GI: No other complaints  --MSK: No other complaints    All other systems reviewed are negative    PHYSICAL EXAM:   /62 (BP Location: Left arm, Patient Position: Sitting, Cuff Size: adult)   Pulse 75   Temp 97.7 °F (36.5 °C) (Temporal)   Resp 16   Ht 5' (1.524 m)   Wt 133 lb (60.3 kg)   SpO2 97%   BMI 25.97 kg/m²     Gen: NAD  HEENT: NCAT, pupils equal and round  Pulm: CTAB, no wheezing  CV: RRR  Ext: full ROM  Psych: normal affect     ASSESSMENT/PLAN:     1. Thrombophlebitis of superficial veins of right lower extremity  2. Right leg swelling  -c/w asa 325 as directed  -repeat US scheduled  -f/u with hematology as planned    3. Other iron deficiency anemia  -iron low  -start otc iron once daily  -referred to GI for further input  -also asked to review  with hematology at upcoming appt  -reviewed options with patient    - Gastro Referral - External    4. Periumbilical mass  -f/u with gen surg as plannd          Chronic Conditions:    No problem-specific Assessment & Plan notes found for this encounter.       Health Maintenance:  Health Maintenance   Topic Date Due    Zoster Vaccines (1 of 2) Never done    Pneumococcal Vaccine: 50+ Years (2 of 2 - PCV) 09/12/2018    COVID-19 Vaccine (1 - 2024-25 season) 11/17/2039 (Originally 9/1/2024)    Influenza Vaccine (1) 10/01/2025    Annual Physical  05/20/2026    Annual Depression Screening  Completed    Fall Risk Screening (Annual)  Completed    Meningococcal B Vaccine  Aged Out               The patient is asked to return in 2-3 months.    Orders This Visit:  No orders of the defined types were placed in this encounter.      Meds This Visit:  Requested Prescriptions      No prescriptions requested or ordered in this encounter       Imaging & Referrals:  GASTRO - EXTERNAL     AYLIN CELESTE MD    I spent a total of 40 minutes, more than half of which was spent counseling/coordinating care regarding thrombophlebitis, iron, abd mass       [1]   Past Medical History:   FHx: supraventricular tachycardia    Hypertension   [2]   Past Surgical History:  Procedure Laterality Date    Cataract      Cholecystectomy      Colectomy      in [place]    Hernia surgery      Knee replacement surgery Bilateral     Laparoscopic cholecystectomy      Other      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 No pertinent family history.  [4]   Social History  Socioeconomic History    Marital status:    Tobacco Use    Smoking status: Never    Smokeless tobacco: Never   Vaping Use    Vaping status: Never Used   Substance and Sexual Activity    Alcohol use: Not Currently    Drug use: Never   Other Topics Concern    Caffeine Concern Yes     Comment: 1 cup of coffee daily    Stress Concern No    Weight Concern No    Special Diet  No    Exercise No     Comment: daily walking    Seat Belt Yes     Social Drivers of Health     Food Insecurity: No Food Insecurity (7/3/2025)    NCSS - Food Insecurity     Worried About Running Out of Food in the Last Year: No     Ran Out of Food in the Last Year: No   Transportation Needs: No Transportation Needs (7/3/2025)    NCSS - Transportation     Lack of Transportation: No   Housing Stability: Not At Risk (7/3/2025)    NCSS - Housing/Utilities     Has Housing: Yes     Worried About Losing Housing: No     Unable to Get Utilities: No   [5]   Current Outpatient Medications   Medication Sig Dispense Refill    amoxicillin clavulanate 875-125 MG Oral Tab Take 1 tablet by mouth 2 (two) times daily. 20 tablet 0    aspirin 325 MG Oral Tab Take 1 tablet (325 mg total) by mouth daily. 30 tablet 0    triamcinolone 0.1 % External Cream Apply 1 Application topically 2 (two) times daily. 60 g 0    verapamil 120 MG Oral Tab CR Take 1 tablet (120 mg total) by mouth 2 (two) times daily. 180 tablet 3    B Complex-C-Folic Acid (HM VITAMIN B COMPLEX/VITAMIN C) Oral Tab Take 1 tablet by mouth daily.      Naproxen Sodium 220 MG Oral Cap Take 220 mg by mouth as needed.      cephALEXin 500 MG Oral Cap Take 1 capsule (500 mg total) by mouth 3 (three) times daily. (Patient not taking: Reported on 7/3/2025) 30 capsule 0    furosemide 20 MG Oral Tab Take 1 tablet (20 mg total) by mouth daily. (Patient not taking: Reported on 7/3/2025) 5 tablet 1    prednisoLONE 1 % Ophthalmic Suspension  (Patient not taking: Reported on 7/3/2025)      atropine 1 % Ophthalmic Solution Place 1 drop into the left eye 2 (two) times daily. (Patient not taking: Reported on 7/3/2025)      difluprednate 0.05 % Ophthalmic Emulsion Place 1 drop into the left eye 4 (four) times daily. (Patient not taking: Reported on 7/3/2025)     [6]   Allergies  Allergen Reactions    Diltiazem UNKNOWN

## 2025-07-03 NOTE — PATIENT INSTRUCTIONS
Empeza cherry 325mg diario sin receta    Llama para sigue con gastrologo    Revisa las resultas de lazara con Dr. Wetzel en londono proxima bertha    Sigue con el especialista de surgeria para londono estomago    Regresa en 3 meses, antes si necessita

## 2025-07-17 ENCOUNTER — OFFICE VISIT (OUTPATIENT)
Facility: LOCATION | Age: 89
End: 2025-07-17

## 2025-07-17 VITALS
OXYGEN SATURATION: 96 % | HEART RATE: 70 BPM | SYSTOLIC BLOOD PRESSURE: 121 MMHG | TEMPERATURE: 98 F | DIASTOLIC BLOOD PRESSURE: 65 MMHG

## 2025-07-17 DIAGNOSIS — R22.2 ABDOMINAL WALL MASS: Primary | ICD-10-CM

## 2025-07-17 PROCEDURE — 99205 OFFICE O/P NEW HI 60 MIN: CPT | Performed by: STUDENT IN AN ORGANIZED HEALTH CARE EDUCATION/TRAINING PROGRAM

## 2025-07-17 RX ORDER — SULFAMETHOXAZOLE AND TRIMETHOPRIM 800; 160 MG/1; MG/1
1 TABLET ORAL 2 TIMES DAILY
COMMUNITY

## 2025-07-23 ENCOUNTER — HOSPITAL ENCOUNTER (OUTPATIENT)
Dept: ULTRASOUND IMAGING | Age: 89
Discharge: HOME | End: 2025-07-23
Attending: FAMILY MEDICINE
Payer: MEDICAID

## 2025-07-23 ENCOUNTER — TELEPHONE (OUTPATIENT)
Facility: LOCATION | Age: 89
End: 2025-07-23

## 2025-07-23 DIAGNOSIS — R10.30 LOWER ABDOMINAL PAIN: ICD-10-CM

## 2025-07-23 PROCEDURE — 76705 ECHO EXAM OF ABDOMEN: CPT | Performed by: FAMILY MEDICINE

## 2025-07-23 NOTE — TELEPHONE ENCOUNTER
Patient's granddaughter calling to leave a message. Patient has an appoinment on 8/16 for the biopsy, but her appointment in the office is on the 18th. Moving apointment.   Patient takes aspirin, they were told to ask the doctor if she should suspend it, or take it like normal with with biopsy coming up.     Please advise  Best callback number is kim Jiménez [phone]    Future Appointments   Date Time Provider Department Center   7/24/2025  2:00 PM PF  RM3 PF [facility]   8/5/2025  8:30 AM  [facility]  [facility]   8/5/2025  9:30 AM  US RM 3 [facility]   8/20/2025  9:30 AM Charbel Junior MD [id number]   10/6/2025 10:00 AM Elder Jaimes MD EMG 28 [facility] [place]

## 2025-07-23 NOTE — TELEPHONE ENCOUNTER
Spoke with Grand daughter of patient.  Grand daughter advised patient US biopsy is scheduled for 8/5 not 8/16.   The Original office appointment for 8/18 has bed re-established.  Grand daughter further advised to contact prescribing physician on if patient should hold the aspirin 325 mg.  Grand daughter verbalized understanding.

## 2025-07-24 ENCOUNTER — HOSPITAL ENCOUNTER (OUTPATIENT)
Dept: ULTRASOUND IMAGING | Age: 89
Discharge: HOME | End: 2025-07-24
Attending: INTERNAL MEDICINE
Payer: MEDICAID

## 2025-07-24 DIAGNOSIS — I82.811 SUPERFICIAL THROMBOSIS OF LEG, RIGHT: ICD-10-CM

## 2025-07-24 PROCEDURE — 93971 EXTREMITY STUDY: CPT | Performed by: INTERNAL MEDICINE

## 2025-07-25 ENCOUNTER — TELEPHONE (OUTPATIENT)
Facility: LOCATION | Age: 89
End: 2025-07-25

## 2025-07-25 NOTE — TELEPHONE ENCOUNTER
From: Shonda Wetzel MD   Sent: 7/25/2025   9:35 AM CDT   To: Edw Dudley Wetzel Rns   Subject: Test results                                     Please call daughter, US looks better, stay on full dose aspirin and repeat another US in 2 months and see me in office after that thanks         Informed patient's daughter.  Pt's daughter states she is scheduled for a biopsy at [place] on August 5.  She wanted to know if she needs to stop the aspirin for the biopsy.

## 2025-08-05 ENCOUNTER — HOSPITAL ENCOUNTER (OUTPATIENT)
Dept: ULTRASOUND IMAGING | Facility: HOSPITAL | Age: 89
Discharge: HOME | End: 2025-08-05
Attending: STUDENT IN AN ORGANIZED HEALTH CARE EDUCATION/TRAINING PROGRAM

## 2025-08-05 DIAGNOSIS — R22.2 ABDOMINAL WALL MASS: ICD-10-CM

## 2025-08-05 PROCEDURE — 88305 TISSUE EXAM BY PATHOLOGIST: CPT | Performed by: STUDENT IN AN ORGANIZED HEALTH CARE EDUCATION/TRAINING PROGRAM

## 2025-08-05 PROCEDURE — 20206 BIOPSY MUSCLE PERQ NEEDLE: CPT | Performed by: STUDENT IN AN ORGANIZED HEALTH CARE EDUCATION/TRAINING PROGRAM

## 2025-08-05 PROCEDURE — 88312 SPECIAL STAINS GROUP 1: CPT | Performed by: STUDENT IN AN ORGANIZED HEALTH CARE EDUCATION/TRAINING PROGRAM

## 2025-08-05 PROCEDURE — 88342 IMHCHEM/IMCYTCHM 1ST ANTB: CPT | Performed by: STUDENT IN AN ORGANIZED HEALTH CARE EDUCATION/TRAINING PROGRAM

## 2025-08-05 PROCEDURE — 88341 IMHCHEM/IMCYTCHM EA ADD ANTB: CPT | Performed by: STUDENT IN AN ORGANIZED HEALTH CARE EDUCATION/TRAINING PROGRAM

## 2025-08-05 PROCEDURE — 76942 ECHO GUIDE FOR BIOPSY: CPT | Performed by: STUDENT IN AN ORGANIZED HEALTH CARE EDUCATION/TRAINING PROGRAM

## 2025-08-18 ENCOUNTER — OFFICE VISIT (OUTPATIENT)
Facility: LOCATION | Age: 89
End: 2025-08-18

## 2025-08-18 VITALS
TEMPERATURE: 97 F | DIASTOLIC BLOOD PRESSURE: 80 MMHG | HEART RATE: 79 BPM | SYSTOLIC BLOOD PRESSURE: 129 MMHG | OXYGEN SATURATION: 96 %

## 2025-08-18 DIAGNOSIS — R22.2 ABDOMINAL WALL MASS: Primary | ICD-10-CM

## 2025-08-18 DIAGNOSIS — K42.9 UMBILICAL HERNIA WITHOUT OBSTRUCTION AND WITHOUT GANGRENE: ICD-10-CM

## 2025-08-19 DIAGNOSIS — I80.01 SUPERFICIAL THROMBOPHLEBITIS OF RIGHT LEG: Primary | ICD-10-CM

## 2025-08-19 RX ORDER — ASPIRIN 325 MG
325 TABLET ORAL DAILY
Qty: 30 TABLET | Refills: 0 | Status: SHIPPED | OUTPATIENT
Start: 2025-08-19